# Patient Record
Sex: FEMALE | Race: WHITE | NOT HISPANIC OR LATINO | Employment: OTHER | URBAN - METROPOLITAN AREA
[De-identification: names, ages, dates, MRNs, and addresses within clinical notes are randomized per-mention and may not be internally consistent; named-entity substitution may affect disease eponyms.]

---

## 2017-01-01 ENCOUNTER — HOSPITAL ENCOUNTER (INPATIENT)
Facility: HOSPITAL | Age: 77
LOS: 3 days | Discharge: HOME | DRG: 064 | End: 2017-02-19
Attending: INTERNAL MEDICINE | Admitting: INTERNAL MEDICINE
Payer: MEDICARE

## 2017-01-01 ENCOUNTER — HOSPITAL ENCOUNTER (OUTPATIENT)
Dept: MRI IMAGING | Facility: HOSPITAL | Age: 77
Discharge: HOME | End: 2017-06-05
Payer: MEDICARE

## 2017-01-01 ENCOUNTER — HOSPITAL ENCOUNTER (OUTPATIENT)
Dept: INFUSION CENTER | Facility: HOSPITAL | Age: 77
Discharge: HOME | End: 2017-04-07
Payer: MEDICARE

## 2017-01-01 ENCOUNTER — TRANSCRIBE ORDERS (OUTPATIENT)
Dept: LAB | Facility: CLINIC | Age: 77
End: 2017-01-01

## 2017-01-01 ENCOUNTER — GENERIC CONVERSION - ENCOUNTER (OUTPATIENT)
Dept: OTHER | Facility: OTHER | Age: 77
End: 2017-01-01

## 2017-01-01 ENCOUNTER — HOSPITAL ENCOUNTER (INPATIENT)
Facility: HOSPITAL | Age: 77
LOS: 1 days | DRG: 271 | End: 2017-08-05
Attending: INTERNAL MEDICINE | Admitting: INTERNAL MEDICINE
Payer: MEDICARE

## 2017-01-01 ENCOUNTER — HOSPITAL ENCOUNTER (OUTPATIENT)
Facility: HOSPITAL | Age: 77
Setting detail: OUTPATIENT SURGERY
Discharge: HOME | End: 2017-03-31
Attending: SURGERY | Admitting: SURGERY
Payer: MEDICARE

## 2017-01-01 ENCOUNTER — APPOINTMENT (OUTPATIENT)
Dept: RADIATION ONCOLOGY | Facility: CLINIC | Age: 77
End: 2017-01-01
Attending: RADIOLOGY
Payer: MEDICARE

## 2017-01-01 ENCOUNTER — APPOINTMENT (INPATIENT)
Dept: RADIOLOGY | Facility: HOSPITAL | Age: 77
DRG: 271 | End: 2017-01-01
Payer: MEDICARE

## 2017-01-01 ENCOUNTER — HOSPITAL ENCOUNTER (OUTPATIENT)
Dept: NON INVASIVE DIAGNOSTICS | Facility: HOSPITAL | Age: 77
Discharge: HOME | End: 2017-01-06
Attending: INTERNAL MEDICINE
Payer: MEDICARE

## 2017-01-01 ENCOUNTER — ALLSCRIPTS OFFICE VISIT (OUTPATIENT)
Dept: OTHER | Facility: OTHER | Age: 77
End: 2017-01-01

## 2017-01-01 ENCOUNTER — APPOINTMENT (OUTPATIENT)
Dept: LAB | Facility: HOSPITAL | Age: 77
End: 2017-01-01
Attending: INTERNAL MEDICINE
Payer: MEDICARE

## 2017-01-01 ENCOUNTER — ANESTHESIA EVENT (INPATIENT)
Dept: PERIOP | Facility: HOSPITAL | Age: 77
DRG: 271 | End: 2017-01-01
Payer: MEDICARE

## 2017-01-01 ENCOUNTER — GENERIC CONVERSION - ENCOUNTER (OUTPATIENT)
Dept: INTERNAL MEDICINE CLINIC | Facility: CLINIC | Age: 77
End: 2017-01-01

## 2017-01-01 ENCOUNTER — HOSPITAL ENCOUNTER (INPATIENT)
Dept: RADIOLOGY | Facility: HOSPITAL | Age: 77
Discharge: HOME | DRG: 064 | End: 2017-02-17
Attending: INTERNAL MEDICINE
Payer: MEDICARE

## 2017-01-01 ENCOUNTER — HOSPITAL ENCOUNTER (EMERGENCY)
Facility: HOSPITAL | Age: 77
End: 2017-08-04
Attending: EMERGENCY MEDICINE | Admitting: EMERGENCY MEDICINE
Payer: MEDICARE

## 2017-01-01 ENCOUNTER — HOSPITAL ENCOUNTER (OUTPATIENT)
Dept: NON INVASIVE DIAGNOSTICS | Facility: HOSPITAL | Age: 77
Discharge: HOME | End: 2017-03-08
Attending: INTERNAL MEDICINE
Payer: MEDICARE

## 2017-01-01 ENCOUNTER — APPOINTMENT (INPATIENT)
Dept: RADIOLOGY | Facility: HOSPITAL | Age: 77
DRG: 064 | End: 2017-01-01
Payer: MEDICARE

## 2017-01-01 ENCOUNTER — HOSPITAL ENCOUNTER (OUTPATIENT)
Dept: NON INVASIVE DIAGNOSTICS | Facility: HOSPITAL | Age: 77
Discharge: HOME | End: 2017-02-08
Attending: INTERNAL MEDICINE
Payer: MEDICARE

## 2017-01-01 ENCOUNTER — ANESTHESIA (INPATIENT)
Dept: PERIOP | Facility: HOSPITAL | Age: 77
DRG: 271 | End: 2017-01-01
Payer: MEDICARE

## 2017-01-01 ENCOUNTER — ANESTHESIA EVENT (OUTPATIENT)
Dept: PERIOP | Facility: HOSPITAL | Age: 77
End: 2017-01-01
Payer: MEDICARE

## 2017-01-01 ENCOUNTER — HOSPITAL ENCOUNTER (EMERGENCY)
Facility: HOSPITAL | Age: 77
End: 2017-02-16
Attending: EMERGENCY MEDICINE | Admitting: EMERGENCY MEDICINE
Payer: MEDICARE

## 2017-01-01 ENCOUNTER — APPOINTMENT (EMERGENCY)
Dept: CT IMAGING | Facility: HOSPITAL | Age: 77
End: 2017-01-01
Payer: MEDICARE

## 2017-01-01 ENCOUNTER — HOSPITAL ENCOUNTER (OUTPATIENT)
Dept: CT IMAGING | Facility: HOSPITAL | Age: 77
Discharge: HOME | End: 2017-05-15
Attending: INTERNAL MEDICINE
Payer: MEDICARE

## 2017-01-01 ENCOUNTER — HOSPITAL ENCOUNTER (OUTPATIENT)
Dept: INFUSION CENTER | Facility: HOSPITAL | Age: 77
Discharge: HOME | End: 2017-02-01
Payer: MEDICARE

## 2017-01-01 ENCOUNTER — HOSPITAL ENCOUNTER (OUTPATIENT)
Dept: INFUSION CENTER | Facility: HOSPITAL | Age: 77
Discharge: HOME | End: 2017-01-12
Payer: MEDICARE

## 2017-01-01 ENCOUNTER — APPOINTMENT (EMERGENCY)
Dept: RADIOLOGY | Facility: HOSPITAL | Age: 77
End: 2017-01-01
Payer: MEDICARE

## 2017-01-01 ENCOUNTER — TRANSCRIBE ORDERS (OUTPATIENT)
Dept: ADMINISTRATIVE | Facility: HOSPITAL | Age: 77
End: 2017-01-01

## 2017-01-01 ENCOUNTER — HOSPITAL ENCOUNTER (OUTPATIENT)
Dept: INFUSION CENTER | Facility: HOSPITAL | Age: 77
Discharge: HOME | End: 2017-02-20
Payer: MEDICARE

## 2017-01-01 ENCOUNTER — HOSPITAL ENCOUNTER (OUTPATIENT)
Dept: NON INVASIVE DIAGNOSTICS | Facility: HOSPITAL | Age: 77
Discharge: HOME | End: 2017-08-05
Attending: ANESTHESIOLOGY
Payer: MEDICARE

## 2017-01-01 ENCOUNTER — APPOINTMENT (OUTPATIENT)
Dept: LAB | Facility: CLINIC | Age: 77
End: 2017-01-01
Attending: RADIOLOGY
Payer: MEDICARE

## 2017-01-01 ENCOUNTER — APPOINTMENT (INPATIENT)
Dept: NON INVASIVE DIAGNOSTICS | Facility: HOSPITAL | Age: 77
DRG: 271 | End: 2017-01-01
Payer: MEDICARE

## 2017-01-01 ENCOUNTER — SOCIAL WORK (OUTPATIENT)
Dept: SOCIAL WORK | Facility: HOSPITAL | Age: 77
End: 2017-01-01

## 2017-01-01 ENCOUNTER — ANESTHESIA (OUTPATIENT)
Dept: PERIOP | Facility: HOSPITAL | Age: 77
End: 2017-01-01
Payer: MEDICARE

## 2017-01-01 ENCOUNTER — HOSPITAL ENCOUNTER (OUTPATIENT)
Dept: MRI IMAGING | Facility: HOSPITAL | Age: 77
Discharge: HOME | End: 2017-04-03
Payer: MEDICARE

## 2017-01-01 ENCOUNTER — APPOINTMENT (OUTPATIENT)
Dept: LAB | Facility: HOSPITAL | Age: 77
End: 2017-01-01
Payer: MEDICARE

## 2017-01-01 VITALS
WEIGHT: 105.38 LBS | HEART RATE: 80 BPM | RESPIRATION RATE: 18 BRPM | SYSTOLIC BLOOD PRESSURE: 148 MMHG | BODY MASS INDEX: 19.39 KG/M2 | OXYGEN SATURATION: 97 % | TEMPERATURE: 97.8 F | HEIGHT: 62 IN | DIASTOLIC BLOOD PRESSURE: 72 MMHG

## 2017-01-01 VITALS
SYSTOLIC BLOOD PRESSURE: 159 MMHG | TEMPERATURE: 98.7 F | HEART RATE: 85 BPM | DIASTOLIC BLOOD PRESSURE: 68 MMHG | HEIGHT: 62 IN | WEIGHT: 106.48 LBS | OXYGEN SATURATION: 97 % | BODY MASS INDEX: 19.6 KG/M2 | RESPIRATION RATE: 20 BRPM

## 2017-01-01 VITALS
HEART RATE: 89 BPM | WEIGHT: 106.6 LBS | TEMPERATURE: 98.9 F | DIASTOLIC BLOOD PRESSURE: 82 MMHG | BODY MASS INDEX: 19.52 KG/M2 | OXYGEN SATURATION: 98 % | RESPIRATION RATE: 18 BRPM | SYSTOLIC BLOOD PRESSURE: 193 MMHG

## 2017-01-01 VITALS
BODY MASS INDEX: 19.51 KG/M2 | WEIGHT: 106.04 LBS | OXYGEN SATURATION: 96 % | RESPIRATION RATE: 18 BRPM | TEMPERATURE: 98.5 F | HEIGHT: 62 IN | DIASTOLIC BLOOD PRESSURE: 72 MMHG | SYSTOLIC BLOOD PRESSURE: 140 MMHG | HEART RATE: 88 BPM

## 2017-01-01 VITALS
SYSTOLIC BLOOD PRESSURE: 102 MMHG | TEMPERATURE: 98 F | WEIGHT: 105.82 LBS | RESPIRATION RATE: 18 BRPM | DIASTOLIC BLOOD PRESSURE: 50 MMHG | HEART RATE: 85 BPM | BODY MASS INDEX: 19.35 KG/M2 | OXYGEN SATURATION: 97 %

## 2017-01-01 VITALS
SYSTOLIC BLOOD PRESSURE: 102 MMHG | TEMPERATURE: 93.8 F | BODY MASS INDEX: 18.48 KG/M2 | HEIGHT: 63 IN | RESPIRATION RATE: 24 BRPM | OXYGEN SATURATION: 97 % | WEIGHT: 104.28 LBS | DIASTOLIC BLOOD PRESSURE: 62 MMHG | HEART RATE: 136 BPM

## 2017-01-01 VITALS
HEART RATE: 93 BPM | SYSTOLIC BLOOD PRESSURE: 142 MMHG | HEIGHT: 62 IN | OXYGEN SATURATION: 96 % | DIASTOLIC BLOOD PRESSURE: 80 MMHG | WEIGHT: 106 LBS | TEMPERATURE: 98.7 F | BODY MASS INDEX: 19.51 KG/M2 | RESPIRATION RATE: 20 BRPM

## 2017-01-01 DIAGNOSIS — I62.00 SUBDURAL HEMORRHAGE (HCC): Primary | ICD-10-CM

## 2017-01-01 DIAGNOSIS — C71.9 MALIGNANT NEOPLASM OF BRAIN, UNSPECIFIED LOCATION (HCC): Primary | ICD-10-CM

## 2017-01-01 DIAGNOSIS — N17.9 AKI (ACUTE KIDNEY INJURY) (HCC): ICD-10-CM

## 2017-01-01 DIAGNOSIS — C79.31 BRAIN METASTASIS (HCC): ICD-10-CM

## 2017-01-01 DIAGNOSIS — E78.5 HYPERLIPIDEMIA: ICD-10-CM

## 2017-01-01 DIAGNOSIS — C34.91 PRIMARY LUNG CANCER WITH METASTASIS FROM LUNG TO OTHER SITE, RIGHT (HCC): Primary | ICD-10-CM

## 2017-01-01 DIAGNOSIS — J90 PLEURAL EFFUSION, NOT ELSEWHERE CLASSIFIED: ICD-10-CM

## 2017-01-01 DIAGNOSIS — C79.49 SECONDARY MALIGNANT NEOPLASM OF BRAIN AND SPINAL CORD (HCC): Primary | ICD-10-CM

## 2017-01-01 DIAGNOSIS — I31.3 PERICARDIAL EFFUSION: ICD-10-CM

## 2017-01-01 DIAGNOSIS — I10 ESSENTIAL (PRIMARY) HYPERTENSION: ICD-10-CM

## 2017-01-01 DIAGNOSIS — R19.7 DIARRHEA: Primary | ICD-10-CM

## 2017-01-01 DIAGNOSIS — C34.91 MALIGNANT NEOPLASM OF UNSPECIFIED PART OF RIGHT BRONCHUS OR LUNG (HCC): ICD-10-CM

## 2017-01-01 DIAGNOSIS — I31.3 MALIGNANT PERICARDIAL EFFUSION (HCC): ICD-10-CM

## 2017-01-01 DIAGNOSIS — C71.9 MALIGNANT NEOPLASM OF BRAIN, UNSPECIFIED LOCATION (HCC): ICD-10-CM

## 2017-01-01 DIAGNOSIS — I31.9 PERICARDIAL DISEASE: ICD-10-CM

## 2017-01-01 DIAGNOSIS — R79.89 ELEVATED LACTIC ACID LEVEL: ICD-10-CM

## 2017-01-01 DIAGNOSIS — I62.00 SUBDURAL BLEEDING (HCC): Primary | ICD-10-CM

## 2017-01-01 DIAGNOSIS — C79.31 SECONDARY MALIGNANT NEOPLASM OF BRAIN (HCC): ICD-10-CM

## 2017-01-01 DIAGNOSIS — I31.3 NONINFLAMMATORY PERICARDIAL EFFUSION: ICD-10-CM

## 2017-01-01 DIAGNOSIS — C80.1 MALIGNANT PERICARDIAL EFFUSION (HCC): ICD-10-CM

## 2017-01-01 DIAGNOSIS — R90.0 INTRACRANIAL SPACE-OCCUPYING LESION FOUND ON DIAGNOSTIC IMAGING OF CENTRAL NERVOUS SYSTEM: ICD-10-CM

## 2017-01-01 DIAGNOSIS — Z95.828 BLOOD VESSEL REPLACED BY OTHER MEANS: ICD-10-CM

## 2017-01-01 DIAGNOSIS — C78.7 SECONDARY MALIGNANT NEOPLASM OF LIVER AND INTRAHEPATIC BILE DUCT (HCC): ICD-10-CM

## 2017-01-01 DIAGNOSIS — C79.31 SECONDARY MALIGNANT NEOPLASM OF BRAIN AND SPINAL CORD (HCC): Primary | ICD-10-CM

## 2017-01-01 DIAGNOSIS — C34.91 PRIMARY LUNG CANCER WITH METASTASIS FROM LUNG TO OTHER SITE, RIGHT (HCC): ICD-10-CM

## 2017-01-01 DIAGNOSIS — R26.89 IMBALANCE: ICD-10-CM

## 2017-01-01 DIAGNOSIS — C80.1 ADENOCARCINOMA (HCC): ICD-10-CM

## 2017-01-01 DIAGNOSIS — C79.49 SECONDARY MALIGNANT NEOPLASM OF BRAIN AND SPINAL CORD (HCC): ICD-10-CM

## 2017-01-01 DIAGNOSIS — C79.31 SECONDARY MALIGNANT NEOPLASM OF BRAIN AND SPINAL CORD (HCC): ICD-10-CM

## 2017-01-01 DIAGNOSIS — J91.0 MALIGNANT PLEURAL EFFUSION: Primary | ICD-10-CM

## 2017-01-01 DIAGNOSIS — R34 OLIGURIA: ICD-10-CM

## 2017-01-01 DIAGNOSIS — R57.9 SHOCK CIRCULATORY (HCC): ICD-10-CM

## 2017-01-01 LAB
ABO GROUP BLD: NORMAL
ABO GROUP BLD: NORMAL
ALBUMIN SERPL BCP-MCNC: 2 G/DL (ref 3.5–5)
ALBUMIN SERPL BCP-MCNC: 2.6 G/DL (ref 3.5–5)
ALBUMIN SERPL BCP-MCNC: 2.7 G/DL (ref 3.5–5)
ALBUMIN SERPL BCP-MCNC: 2.9 G/DL (ref 3.5–5)
ALBUMIN SERPL BCP-MCNC: 2.9 G/DL (ref 3.5–5)
ALBUMIN SERPL BCP-MCNC: 3 G/DL (ref 3.5–5)
ALBUMIN SERPL BCP-MCNC: 3 G/DL (ref 3.5–5)
ALBUMIN SERPL BCP-MCNC: 3.1 G/DL (ref 3.5–5)
ALBUMIN SERPL BCP-MCNC: 3.2 G/DL (ref 3.5–5)
ALBUMIN SERPL BCP-MCNC: 3.3 G/DL (ref 3.5–5)
ALP SERPL-CCNC: 109 U/L (ref 46–116)
ALP SERPL-CCNC: 65 U/L (ref 46–116)
ALP SERPL-CCNC: 66 U/L (ref 46–116)
ALP SERPL-CCNC: 73 U/L (ref 46–116)
ALP SERPL-CCNC: 74 U/L (ref 46–116)
ALP SERPL-CCNC: 77 U/L (ref 46–116)
ALP SERPL-CCNC: 78 U/L (ref 46–116)
ALP SERPL-CCNC: 78 U/L (ref 46–116)
ALP SERPL-CCNC: 85 U/L (ref 46–116)
ALP SERPL-CCNC: 95 U/L (ref 46–116)
ALT SERPL W P-5'-P-CCNC: 19 U/L (ref 12–78)
ALT SERPL W P-5'-P-CCNC: 2032 U/L (ref 12–78)
ALT SERPL W P-5'-P-CCNC: 22 U/L (ref 12–78)
ALT SERPL W P-5'-P-CCNC: 23 U/L (ref 12–78)
ALT SERPL W P-5'-P-CCNC: 23 U/L (ref 12–78)
ALT SERPL W P-5'-P-CCNC: 28 U/L (ref 12–78)
ALT SERPL W P-5'-P-CCNC: 30 U/L (ref 12–78)
ALT SERPL W P-5'-P-CCNC: 36 U/L (ref 12–78)
ALT SERPL W P-5'-P-CCNC: 37 U/L (ref 12–78)
ALT SERPL W P-5'-P-CCNC: 96 U/L (ref 12–78)
ANION GAP SERPL CALCULATED.3IONS-SCNC: 10 MMOL/L (ref 4–13)
ANION GAP SERPL CALCULATED.3IONS-SCNC: 10 MMOL/L (ref 4–13)
ANION GAP SERPL CALCULATED.3IONS-SCNC: 11 MMOL/L (ref 4–13)
ANION GAP SERPL CALCULATED.3IONS-SCNC: 15 MMOL/L (ref 4–13)
ANION GAP SERPL CALCULATED.3IONS-SCNC: 20 MMOL/L (ref 4–13)
ANION GAP SERPL CALCULATED.3IONS-SCNC: 25 MMOL/L (ref 4–13)
ANION GAP SERPL CALCULATED.3IONS-SCNC: 5 MMOL/L (ref 4–13)
ANION GAP SERPL CALCULATED.3IONS-SCNC: 6 MMOL/L (ref 4–13)
ANION GAP SERPL CALCULATED.3IONS-SCNC: 6 MMOL/L (ref 4–13)
ANION GAP SERPL CALCULATED.3IONS-SCNC: 7 MMOL/L (ref 4–13)
ANION GAP SERPL CALCULATED.3IONS-SCNC: 8 MMOL/L (ref 4–13)
ANION GAP SERPL CALCULATED.3IONS-SCNC: 9 MMOL/L (ref 4–13)
ANISOCYTOSIS BLD QL SMEAR: PRESENT
APTT PPP: 26 SECONDS (ref 24–36)
APTT PPP: 29 SECONDS (ref 23–35)
APTT PPP: 47 SECONDS (ref 23–35)
AST SERPL W P-5'-P-CCNC: 22 U/L (ref 5–45)
AST SERPL W P-5'-P-CCNC: 24 U/L (ref 5–45)
AST SERPL W P-5'-P-CCNC: 2731 U/L (ref 5–45)
AST SERPL W P-5'-P-CCNC: 29 U/L (ref 5–45)
AST SERPL W P-5'-P-CCNC: 31 U/L (ref 5–45)
AST SERPL W P-5'-P-CCNC: 31 U/L (ref 5–45)
AST SERPL W P-5'-P-CCNC: 36 U/L (ref 5–45)
AST SERPL W P-5'-P-CCNC: 37 U/L (ref 5–45)
AST SERPL W P-5'-P-CCNC: 51 U/L (ref 5–45)
AST SERPL W P-5'-P-CCNC: 95 U/L (ref 5–45)
ATRIAL RATE: 103 BPM
BACTERIA CATH TIP CULT: NO GROWTH
BACTERIA CATH TIP CULT: NORMAL
BACTERIA UR QL AUTO: ABNORMAL /HPF
BASE EXCESS BLDA CALC-SCNC: -21 MMOL/L (ref -2–3)
BASE EXCESS BLDA CALC-SCNC: -23 MMOL/L (ref -2–3)
BASE EXCESS BLDA CALC-SCNC: -7 MMOL/L (ref -2–3)
BASOPHILS # BLD AUTO: 0 THOUSAND/UL (ref 0–0.1)
BASOPHILS # BLD AUTO: 0.01 THOUSANDS/ΜL (ref 0–0.1)
BASOPHILS # BLD AUTO: 0.02 THOUSANDS/ΜL (ref 0–0.1)
BASOPHILS # BLD AUTO: 0.03 THOUSANDS/ΜL (ref 0–0.1)
BASOPHILS # BLD AUTO: 0.04 THOUSANDS/ΜL (ref 0–0.1)
BASOPHILS # BLD AUTO: 0.12 THOUSAND/UL (ref 0–0.1)
BASOPHILS # BLD MANUAL: 0 THOUSAND/UL (ref 0–0.1)
BASOPHILS # BLD MANUAL: 0 THOUSAND/UL (ref 0–0.1)
BASOPHILS NFR BLD AUTO: 0 % (ref 0–1)
BASOPHILS NFR BLD AUTO: 1 % (ref 0–1)
BASOPHILS NFR MAR MANUAL: 0 % (ref 0–1)
BASOPHILS NFR MAR MANUAL: 1 % (ref 0–1)
BILIRUB SERPL-MCNC: 0.31 MG/DL (ref 0.2–1)
BILIRUB SERPL-MCNC: 0.4 MG/DL (ref 0.2–1)
BILIRUB SERPL-MCNC: 0.46 MG/DL (ref 0.2–1)
BILIRUB SERPL-MCNC: 0.5 MG/DL (ref 0.2–1)
BILIRUB SERPL-MCNC: 0.8 MG/DL (ref 0.2–1)
BILIRUB SERPL-MCNC: 1.31 MG/DL (ref 0.2–1)
BILIRUB UR QL STRIP: ABNORMAL
BLD GP AB SCN SERPL QL: NEGATIVE
BLD GP AB SCN SERPL QL: NEGATIVE
BUN SERPL-MCNC: 11 MG/DL (ref 5–25)
BUN SERPL-MCNC: 11 MG/DL (ref 5–25)
BUN SERPL-MCNC: 12 MG/DL (ref 5–25)
BUN SERPL-MCNC: 13 MG/DL (ref 5–25)
BUN SERPL-MCNC: 14 MG/DL (ref 5–25)
BUN SERPL-MCNC: 14 MG/DL (ref 5–25)
BUN SERPL-MCNC: 18 MG/DL (ref 5–25)
BUN SERPL-MCNC: 18 MG/DL (ref 5–25)
BUN SERPL-MCNC: 21 MG/DL (ref 5–25)
BUN SERPL-MCNC: 24 MG/DL (ref 5–25)
BUN SERPL-MCNC: 29 MG/DL (ref 5–25)
BUN SERPL-MCNC: 30 MG/DL (ref 5–25)
BUN SERPL-MCNC: 31 MG/DL (ref 5–25)
BURR CELLS BLD QL SMEAR: PRESENT
BURR CELLS BLD QL SMEAR: PRESENT
CA-I BLD-SCNC: 0.84 MMOL/L (ref 1.12–1.32)
CA-I BLD-SCNC: 0.95 MMOL/L (ref 1.12–1.32)
CA-I BLD-SCNC: 1.07 MMOL/L (ref 1.12–1.32)
CA-I BLD-SCNC: 1.15 MMOL/L (ref 1.12–1.32)
CALCIUM SERPL-MCNC: 7.2 MG/DL (ref 8.3–10.1)
CALCIUM SERPL-MCNC: 8.4 MG/DL (ref 8.3–10.1)
CALCIUM SERPL-MCNC: 8.5 MG/DL (ref 8.3–10.1)
CALCIUM SERPL-MCNC: 8.5 MG/DL (ref 8.3–10.1)
CALCIUM SERPL-MCNC: 8.6 MG/DL (ref 8.3–10.1)
CALCIUM SERPL-MCNC: 8.8 MG/DL (ref 8.3–10.1)
CALCIUM SERPL-MCNC: 8.9 MG/DL (ref 8.3–10.1)
CALCIUM SERPL-MCNC: 9 MG/DL (ref 8.3–10.1)
CALCIUM SERPL-MCNC: 9.1 MG/DL (ref 8.3–10.1)
CALCIUM SERPL-MCNC: 9.1 MG/DL (ref 8.3–10.1)
CHLORIDE SERPL-SCNC: 100 MMOL/L (ref 100–108)
CHLORIDE SERPL-SCNC: 101 MMOL/L (ref 100–108)
CHLORIDE SERPL-SCNC: 102 MMOL/L (ref 100–108)
CHLORIDE SERPL-SCNC: 103 MMOL/L (ref 100–108)
CHLORIDE SERPL-SCNC: 103 MMOL/L (ref 100–108)
CHLORIDE SERPL-SCNC: 104 MMOL/L (ref 100–108)
CHLORIDE SERPL-SCNC: 106 MMOL/L (ref 100–108)
CHLORIDE SERPL-SCNC: 110 MMOL/L (ref 100–108)
CHLORIDE SERPL-SCNC: 114 MMOL/L (ref 100–108)
CHLORIDE SERPL-SCNC: 114 MMOL/L (ref 100–108)
CHLORIDE UR-SCNC: 14 MMOL/L (ref 10–330)
CLARITY UR: ABNORMAL
CO2 SERPL-SCNC: 15 MMOL/L (ref 21–32)
CO2 SERPL-SCNC: 25 MMOL/L (ref 21–32)
CO2 SERPL-SCNC: 28 MMOL/L (ref 21–32)
CO2 SERPL-SCNC: 29 MMOL/L (ref 21–32)
CO2 SERPL-SCNC: 30 MMOL/L (ref 21–32)
CO2 SERPL-SCNC: 31 MMOL/L (ref 21–32)
CO2 SERPL-SCNC: 32 MMOL/L (ref 21–32)
CO2 SERPL-SCNC: 34 MMOL/L (ref 21–32)
CO2 SERPL-SCNC: 9 MMOL/L (ref 21–32)
CO2 SERPL-SCNC: 9 MMOL/L (ref 21–32)
COLOR UR: ABNORMAL
CREAT SERPL-MCNC: 0.5 MG/DL (ref 0.6–1.3)
CREAT SERPL-MCNC: 0.59 MG/DL (ref 0.6–1.3)
CREAT SERPL-MCNC: 0.62 MG/DL (ref 0.6–1.3)
CREAT SERPL-MCNC: 0.63 MG/DL (ref 0.6–1.3)
CREAT SERPL-MCNC: 0.64 MG/DL (ref 0.6–1.3)
CREAT SERPL-MCNC: 0.68 MG/DL (ref 0.6–1.3)
CREAT SERPL-MCNC: 0.7 MG/DL (ref 0.6–1.3)
CREAT SERPL-MCNC: 0.7 MG/DL (ref 0.6–1.3)
CREAT SERPL-MCNC: 0.73 MG/DL (ref 0.6–1.3)
CREAT SERPL-MCNC: 1.25 MG/DL (ref 0.6–1.3)
CREAT SERPL-MCNC: 1.46 MG/DL (ref 0.6–1.3)
CREAT SERPL-MCNC: 1.55 MG/DL (ref 0.6–1.3)
CREAT SERPL-MCNC: 1.62 MG/DL (ref 0.6–1.3)
CREAT UR-MCNC: 126 MG/DL
EOSINOPHIL # BLD AUTO: 0 THOUSAND/UL (ref 0–0.61)
EOSINOPHIL # BLD AUTO: 0 THOUSAND/UL (ref 0–0.61)
EOSINOPHIL # BLD AUTO: 0 THOUSAND/ΜL (ref 0–0.61)
EOSINOPHIL # BLD AUTO: 0.01 THOUSAND/ΜL (ref 0–0.61)
EOSINOPHIL # BLD AUTO: 0.06 THOUSAND/ΜL (ref 0–0.61)
EOSINOPHIL # BLD AUTO: 0.06 THOUSAND/ΜL (ref 0–0.61)
EOSINOPHIL # BLD AUTO: 0.07 THOUSAND/ΜL (ref 0–0.61)
EOSINOPHIL # BLD AUTO: 0.09 THOUSAND/ΜL (ref 0–0.61)
EOSINOPHIL # BLD AUTO: 0.13 THOUSAND/ΜL (ref 0–0.61)
EOSINOPHIL # BLD AUTO: 0.19 THOUSAND/ΜL (ref 0–0.61)
EOSINOPHIL # BLD MANUAL: 0 THOUSAND/UL (ref 0–0.4)
EOSINOPHIL # BLD MANUAL: 0 THOUSAND/UL (ref 0–0.4)
EOSINOPHIL NFR BLD AUTO: 0 % (ref 0–6)
EOSINOPHIL NFR BLD AUTO: 0 % (ref 0–6)
EOSINOPHIL NFR BLD AUTO: 1 % (ref 0–6)
EOSINOPHIL NFR BLD AUTO: 2 % (ref 0–6)
EOSINOPHIL NFR BLD AUTO: 3 % (ref 0–6)
EOSINOPHIL NFR BLD AUTO: 4 % (ref 0–6)
EOSINOPHIL NFR BLD MANUAL: 0 % (ref 0–6)
ERYTHROCYTE [DISTWIDTH] IN BLOOD BY AUTOMATED COUNT: 12.7 % (ref 11.6–15.1)
ERYTHROCYTE [DISTWIDTH] IN BLOOD BY AUTOMATED COUNT: 12.8 % (ref 11.6–15.1)
ERYTHROCYTE [DISTWIDTH] IN BLOOD BY AUTOMATED COUNT: 12.8 % (ref 11.6–15.1)
ERYTHROCYTE [DISTWIDTH] IN BLOOD BY AUTOMATED COUNT: 13.1 % (ref 11.6–15.1)
ERYTHROCYTE [DISTWIDTH] IN BLOOD BY AUTOMATED COUNT: 13.2 % (ref 11.6–15.1)
ERYTHROCYTE [DISTWIDTH] IN BLOOD BY AUTOMATED COUNT: 13.3 % (ref 11.6–15.1)
ERYTHROCYTE [DISTWIDTH] IN BLOOD BY AUTOMATED COUNT: 13.3 % (ref 11.6–15.1)
ERYTHROCYTE [DISTWIDTH] IN BLOOD BY AUTOMATED COUNT: 13.4 % (ref 11.6–15.1)
ERYTHROCYTE [DISTWIDTH] IN BLOOD BY AUTOMATED COUNT: 13.8 % (ref 11.6–15.1)
ERYTHROCYTE [DISTWIDTH] IN BLOOD BY AUTOMATED COUNT: 13.8 % (ref 11.6–15.1)
ERYTHROCYTE [DISTWIDTH] IN BLOOD BY AUTOMATED COUNT: 13.9 % (ref 11.6–15.1)
ERYTHROCYTE [DISTWIDTH] IN BLOOD BY AUTOMATED COUNT: 14.1 % (ref 11.6–15.1)
ERYTHROCYTE [DISTWIDTH] IN BLOOD BY AUTOMATED COUNT: 15.1 % (ref 11.6–15.1)
ERYTHROCYTE [DISTWIDTH] IN BLOOD BY AUTOMATED COUNT: 16.1 % (ref 11.6–15.1)
FINE GRAN CASTS URNS QL MICRO: ABNORMAL /LPF
GFR SERPL CREATININE-BSD FRML MDRD: 31 ML/MIN/1.73SQ M
GFR SERPL CREATININE-BSD FRML MDRD: 33 ML/MIN/1.73SQ M
GFR SERPL CREATININE-BSD FRML MDRD: 35 ML/MIN/1.73SQ M
GFR SERPL CREATININE-BSD FRML MDRD: 42 ML/MIN/1.73SQ M
GFR SERPL CREATININE-BSD FRML MDRD: >60 ML/MIN/1.73SQ M
GLUCOSE SERPL-MCNC: 103 MG/DL (ref 65–140)
GLUCOSE SERPL-MCNC: 105 MG/DL (ref 65–140)
GLUCOSE SERPL-MCNC: 112 MG/DL (ref 65–140)
GLUCOSE SERPL-MCNC: 116 MG/DL (ref 65–140)
GLUCOSE SERPL-MCNC: 116 MG/DL (ref 65–140)
GLUCOSE SERPL-MCNC: 126 MG/DL (ref 65–140)
GLUCOSE SERPL-MCNC: 155 MG/DL (ref 65–140)
GLUCOSE SERPL-MCNC: 174 MG/DL (ref 65–140)
GLUCOSE SERPL-MCNC: 181 MG/DL (ref 65–140)
GLUCOSE SERPL-MCNC: 22 MG/DL (ref 65–140)
GLUCOSE SERPL-MCNC: 25 MG/DL (ref 65–140)
GLUCOSE SERPL-MCNC: 86 MG/DL (ref 65–140)
GLUCOSE SERPL-MCNC: 91 MG/DL (ref 65–140)
GLUCOSE SERPL-MCNC: 91 MG/DL (ref 65–140)
GLUCOSE SERPL-MCNC: 96 MG/DL (ref 65–140)
GLUCOSE SERPL-MCNC: 97 MG/DL (ref 65–140)
GLUCOSE UR STRIP-MCNC: NEGATIVE MG/DL
HCO3 BLDA-SCNC: 18.9 MMOL/L (ref 22–28)
HCO3 BLDA-SCNC: 5.9 MMOL/L (ref 22–28)
HCO3 BLDA-SCNC: 7.2 MMOL/L (ref 22–28)
HCT VFR BLD AUTO: 32 % (ref 34.8–46.1)
HCT VFR BLD AUTO: 36 % (ref 34.8–46.1)
HCT VFR BLD AUTO: 36.9 % (ref 34.8–46.1)
HCT VFR BLD AUTO: 39.1 % (ref 34.8–46.1)
HCT VFR BLD AUTO: 39.9 % (ref 34.8–46.1)
HCT VFR BLD AUTO: 40.4 % (ref 34.8–46.1)
HCT VFR BLD AUTO: 40.8 % (ref 34.8–46.1)
HCT VFR BLD AUTO: 41.1 % (ref 34.8–46.1)
HCT VFR BLD AUTO: 42.6 % (ref 34.8–46.1)
HCT VFR BLD AUTO: 42.7 % (ref 34.8–46.1)
HCT VFR BLD AUTO: 44.2 % (ref 34.8–46.1)
HCT VFR BLD AUTO: 44.3 % (ref 34.8–46.1)
HCT VFR BLD AUTO: 45.4 % (ref 34.8–46.1)
HCT VFR BLD AUTO: 46.3 % (ref 34.8–46.1)
HCT VFR BLD CALC: 15 % (ref 34.8–46.1)
HCT VFR BLD CALC: 24 % (ref 34.8–46.1)
HCT VFR BLD CALC: 30 % (ref 34.8–46.1)
HGB BLD-MCNC: 10.2 G/DL (ref 11.5–15.4)
HGB BLD-MCNC: 11.7 G/DL (ref 11.5–15.4)
HGB BLD-MCNC: 11.9 G/DL (ref 11.5–15.4)
HGB BLD-MCNC: 12.7 G/DL (ref 11.5–15.4)
HGB BLD-MCNC: 13 G/DL (ref 11.5–15.4)
HGB BLD-MCNC: 13.1 G/DL (ref 11.5–15.4)
HGB BLD-MCNC: 13.2 G/DL (ref 11.5–15.4)
HGB BLD-MCNC: 13.6 G/DL (ref 11.5–15.4)
HGB BLD-MCNC: 13.6 G/DL (ref 11.5–15.4)
HGB BLD-MCNC: 13.8 G/DL (ref 11.5–15.4)
HGB BLD-MCNC: 14.2 G/DL (ref 11.5–15.4)
HGB BLD-MCNC: 14.5 G/DL (ref 11.5–15.4)
HGB BLD-MCNC: 14.6 G/DL (ref 11.5–15.4)
HGB BLD-MCNC: 14.7 G/DL (ref 11.5–15.4)
HGB BLDA-MCNC: 10.2 G/DL (ref 11.5–15.4)
HGB BLDA-MCNC: 5.1 G/DL (ref 11.5–15.4)
HGB BLDA-MCNC: 8.2 G/DL (ref 11.5–15.4)
HGB UR QL STRIP.AUTO: NEGATIVE
HOLD SPECIMEN: NORMAL
INR PPP: 1.1 (ref 0.86–1.16)
INR PPP: 1.23 (ref 0.86–1.16)
INR PPP: 1.66 (ref 0.86–1.16)
INR PPP: 4.34 (ref 0.86–1.16)
INR PPP: 6.55 (ref 0.86–1.16)
KETONES UR STRIP-MCNC: NEGATIVE MG/DL
LACTATE SERPL-SCNC: 11.1 MMOL/L (ref 0.5–2)
LACTATE SERPL-SCNC: 14.8 MMOL/L (ref 0.5–2)
LACTATE SERPL-SCNC: 2.9 MMOL/L (ref 0.5–2)
LACTATE SERPL-SCNC: 3.6 MMOL/L (ref 0.5–2)
LACTATE SERPL-SCNC: 3.9 MMOL/L (ref 0.5–2)
LACTATE SERPL-SCNC: 4 MMOL/L (ref 0.5–2)
LACTATE SERPL-SCNC: 6.5 MMOL/L (ref 0.5–2)
LEUKOCYTE ESTERASE UR QL STRIP: NEGATIVE
LIPASE SERPL-CCNC: 113 U/L (ref 73–393)
LYMPHOCYTES # BLD AUTO: 0.81 THOUSAND/UL (ref 0.6–4.47)
LYMPHOCYTES # BLD AUTO: 0.95 THOUSAND/UL (ref 0.6–4.47)
LYMPHOCYTES # BLD AUTO: 1.06 THOUSANDS/ΜL (ref 0.6–4.47)
LYMPHOCYTES # BLD AUTO: 1.14 THOUSANDS/ΜL (ref 0.6–4.47)
LYMPHOCYTES # BLD AUTO: 1.16 THOUSANDS/ΜL (ref 0.6–4.47)
LYMPHOCYTES # BLD AUTO: 1.21 THOUSANDS/ΜL (ref 0.6–4.47)
LYMPHOCYTES # BLD AUTO: 1.24 THOUSANDS/ΜL (ref 0.6–4.47)
LYMPHOCYTES # BLD AUTO: 1.25 THOUSANDS/ΜL (ref 0.6–4.47)
LYMPHOCYTES # BLD AUTO: 1.28 THOUSAND/UL (ref 0.6–4.47)
LYMPHOCYTES # BLD AUTO: 1.38 THOUSAND/UL (ref 0.6–4.47)
LYMPHOCYTES # BLD AUTO: 1.43 THOUSANDS/ΜL (ref 0.6–4.47)
LYMPHOCYTES # BLD AUTO: 1.58 THOUSANDS/ΜL (ref 0.6–4.47)
LYMPHOCYTES # BLD AUTO: 13 % (ref 14–44)
LYMPHOCYTES # BLD AUTO: 4 % (ref 14–44)
LYMPHOCYTES # BLD AUTO: 8 % (ref 14–44)
LYMPHOCYTES # BLD AUTO: 9 % (ref 14–44)
LYMPHOCYTES NFR BLD AUTO: 13 % (ref 14–44)
LYMPHOCYTES NFR BLD AUTO: 16 % (ref 14–44)
LYMPHOCYTES NFR BLD AUTO: 20 % (ref 14–44)
LYMPHOCYTES NFR BLD AUTO: 20 % (ref 14–44)
LYMPHOCYTES NFR BLD AUTO: 22 % (ref 14–44)
LYMPHOCYTES NFR BLD AUTO: 23 % (ref 14–44)
LYMPHOCYTES NFR BLD AUTO: 24 % (ref 14–44)
LYMPHOCYTES NFR BLD AUTO: 24 % (ref 14–44)
MACROCYTES BLD QL AUTO: PRESENT
MAGNESIUM SERPL-MCNC: 2 MG/DL (ref 1.6–2.6)
MAGNESIUM SERPL-MCNC: 2 MG/DL (ref 1.6–2.6)
MAGNESIUM SERPL-MCNC: 2.1 MG/DL (ref 1.6–2.6)
MAGNESIUM SERPL-MCNC: 2.2 MG/DL (ref 1.6–2.6)
MCH RBC QN AUTO: 29.3 PG (ref 26.8–34.3)
MCH RBC QN AUTO: 30.9 PG (ref 26.8–34.3)
MCH RBC QN AUTO: 31.2 PG (ref 26.8–34.3)
MCH RBC QN AUTO: 31.2 PG (ref 26.8–34.3)
MCH RBC QN AUTO: 31.3 PG (ref 26.8–34.3)
MCH RBC QN AUTO: 31.4 PG (ref 26.8–34.3)
MCH RBC QN AUTO: 31.5 PG (ref 26.8–34.3)
MCH RBC QN AUTO: 31.6 PG (ref 26.8–34.3)
MCH RBC QN AUTO: 31.6 PG (ref 26.8–34.3)
MCH RBC QN AUTO: 31.8 PG (ref 26.8–34.3)
MCH RBC QN AUTO: 31.8 PG (ref 26.8–34.3)
MCH RBC QN AUTO: 31.9 PG (ref 26.8–34.3)
MCH RBC QN AUTO: 31.9 PG (ref 26.8–34.3)
MCH RBC QN AUTO: 32 PG (ref 26.8–34.3)
MCHC RBC AUTO-ENTMCNC: 30.8 G/DL (ref 31.4–37.4)
MCHC RBC AUTO-ENTMCNC: 31.7 G/DL (ref 31.4–37.4)
MCHC RBC AUTO-ENTMCNC: 31.7 G/DL (ref 31.4–37.4)
MCHC RBC AUTO-ENTMCNC: 31.9 G/DL (ref 31.4–37.4)
MCHC RBC AUTO-ENTMCNC: 32.1 G/DL (ref 31.4–37.4)
MCHC RBC AUTO-ENTMCNC: 32.5 G/DL (ref 31.4–37.4)
MCHC RBC AUTO-ENTMCNC: 32.6 G/DL (ref 31.4–37.4)
MCHC RBC AUTO-ENTMCNC: 32.7 G/DL (ref 31.4–37.4)
MCHC RBC AUTO-ENTMCNC: 32.9 G/DL (ref 31.4–37.4)
MCHC RBC AUTO-ENTMCNC: 33.1 G/DL (ref 31.4–37.4)
MCHC RBC AUTO-ENTMCNC: 33.1 G/DL (ref 31.4–37.4)
MCHC RBC AUTO-ENTMCNC: 33.9 G/DL (ref 31.4–37.4)
MCV RBC AUTO: 100 FL (ref 82–98)
MCV RBC AUTO: 100 FL (ref 82–98)
MCV RBC AUTO: 101 FL (ref 82–98)
MCV RBC AUTO: 92 FL (ref 82–98)
MCV RBC AUTO: 93 FL (ref 82–98)
MCV RBC AUTO: 94 FL (ref 82–98)
MCV RBC AUTO: 96 FL (ref 82–98)
MCV RBC AUTO: 96 FL (ref 82–98)
MCV RBC AUTO: 97 FL (ref 82–98)
MCV RBC AUTO: 98 FL (ref 82–98)
MCV RBC AUTO: 99 FL (ref 82–98)
METAMYELOCYTES NFR BLD MANUAL: 1 % (ref 0–1)
MONOCYTES # BLD AUTO: 0.38 THOUSAND/ΜL (ref 0.17–1.22)
MONOCYTES # BLD AUTO: 0.4 THOUSAND/UL (ref 0–1.22)
MONOCYTES # BLD AUTO: 0.42 THOUSAND/ΜL (ref 0.17–1.22)
MONOCYTES # BLD AUTO: 0.53 THOUSAND/UL (ref 0–1.22)
MONOCYTES # BLD AUTO: 0.57 THOUSAND/UL (ref 0–1.22)
MONOCYTES # BLD AUTO: 0.57 THOUSAND/ΜL (ref 0.17–1.22)
MONOCYTES # BLD AUTO: 0.61 THOUSAND/ΜL (ref 0.17–1.22)
MONOCYTES # BLD AUTO: 0.65 THOUSAND/ΜL (ref 0.17–1.22)
MONOCYTES # BLD AUTO: 0.68 THOUSAND/ΜL (ref 0.17–1.22)
MONOCYTES # BLD AUTO: 0.83 THOUSAND/UL (ref 0–1.22)
MONOCYTES # BLD AUTO: 0.87 THOUSAND/ΜL (ref 0.17–1.22)
MONOCYTES # BLD AUTO: 0.93 THOUSAND/ΜL (ref 0.17–1.22)
MONOCYTES NFR BLD AUTO: 10 % (ref 4–12)
MONOCYTES NFR BLD AUTO: 12 % (ref 4–12)
MONOCYTES NFR BLD AUTO: 13 % (ref 4–12)
MONOCYTES NFR BLD AUTO: 5 % (ref 4–12)
MONOCYTES NFR BLD AUTO: 7 % (ref 4–12)
MONOCYTES NFR BLD AUTO: 7 % (ref 4–12)
MONOCYTES NFR BLD AUTO: 8 % (ref 4–12)
MONOCYTES NFR BLD AUTO: 9 % (ref 4–12)
MONOCYTES NFR BLD: 2 % (ref 4–12)
MONOCYTES NFR BLD: 4 % (ref 4–12)
MYELOCYTES NFR BLD MANUAL: 1 % (ref 0–1)
NEUTROPHILS # BLD AUTO: 3.12 THOUSANDS/ΜL (ref 1.85–7.62)
NEUTROPHILS # BLD AUTO: 3.35 THOUSANDS/ΜL (ref 1.85–7.62)
NEUTROPHILS # BLD AUTO: 3.76 THOUSANDS/ΜL (ref 1.85–7.62)
NEUTROPHILS # BLD AUTO: 3.84 THOUSANDS/ΜL (ref 1.85–7.62)
NEUTROPHILS # BLD AUTO: 3.94 THOUSANDS/ΜL (ref 1.85–7.62)
NEUTROPHILS # BLD AUTO: 4.19 THOUSANDS/ΜL (ref 1.85–7.62)
NEUTROPHILS # BLD AUTO: 6.66 THOUSANDS/ΜL (ref 1.85–7.62)
NEUTROPHILS # BLD AUTO: 6.81 THOUSANDS/ΜL (ref 1.85–7.62)
NEUTROPHILS # BLD MANUAL: 12.12 THOUSAND/UL (ref 1.85–7.62)
NEUTROPHILS # BLD MANUAL: 19.01 THOUSAND/UL (ref 1.85–7.62)
NEUTS BAND NFR BLD MANUAL: 0 % (ref 0–8)
NEUTS BAND NFR BLD MANUAL: 2 % (ref 0–8)
NEUTS BAND NFR BLD MANUAL: 2 % (ref 0–8)
NEUTS BAND NFR BLD MANUAL: 3 % (ref 0–8)
NEUTS SEG # BLD: 10 THOUSAND/UL (ref 1.81–6.82)
NEUTS SEG # BLD: 8.69 THOUSAND/UL (ref 1.81–6.82)
NEUTS SEG NFR BLD AUTO: 61 % (ref 43–75)
NEUTS SEG NFR BLD AUTO: 63 % (ref 43–75)
NEUTS SEG NFR BLD AUTO: 65 % (ref 43–75)
NEUTS SEG NFR BLD AUTO: 65 % (ref 43–75)
NEUTS SEG NFR BLD AUTO: 66 % (ref 43–75)
NEUTS SEG NFR BLD AUTO: 72 % (ref 43–75)
NEUTS SEG NFR BLD AUTO: 74 % (ref 43–75)
NEUTS SEG NFR BLD AUTO: 77 % (ref 43–75)
NEUTS SEG NFR BLD AUTO: 82 % (ref 43–75)
NEUTS SEG NFR BLD AUTO: 82 % (ref 43–75)
NEUTS SEG NFR BLD AUTO: 83 % (ref 43–75)
NEUTS SEG NFR BLD AUTO: 91 % (ref 43–75)
NITRITE UR QL STRIP: NEGATIVE
NON-SQ EPI CELLS URNS QL MICRO: ABNORMAL /HPF
NRBC BLD AUTO-RTO: 0 /100 WBCS
P AXIS: 56 DEGREES
PCO2 BLD: 18.3 MM HG (ref 36–44)
PCO2 BLD: 20 MMOL/L (ref 21–32)
PCO2 BLD: 22.5 MM HG (ref 36–44)
PCO2 BLD: 40.3 MM HG (ref 36–44)
PCO2 BLD: 7 MMOL/L (ref 21–32)
PCO2 BLD: 8 MMOL/L (ref 21–32)
PH BLD: 7.03 [PH] (ref 7.35–7.45)
PH BLD: 7.2 [PH] (ref 7.35–7.45)
PH BLD: 7.28 [PH] (ref 7.35–7.45)
PH UR STRIP.AUTO: 5 [PH] (ref 4.5–8)
PHOSPHATE SERPL-MCNC: 6.3 MG/DL (ref 2.3–4.1)
PHOSPHATE SERPL-MCNC: 7.7 MG/DL (ref 2.3–4.1)
PLATELET # BLD AUTO: 137 THOUSANDS/UL (ref 149–390)
PLATELET # BLD AUTO: 196 THOUSANDS/UL (ref 149–390)
PLATELET # BLD AUTO: 225 THOUSANDS/UL (ref 149–390)
PLATELET # BLD AUTO: 226 THOUSANDS/UL (ref 149–390)
PLATELET # BLD AUTO: 228 THOUSANDS/UL (ref 149–390)
PLATELET # BLD AUTO: 230 THOUSANDS/UL (ref 149–390)
PLATELET # BLD AUTO: 237 THOUSANDS/UL (ref 149–390)
PLATELET # BLD AUTO: 263 THOUSANDS/UL (ref 149–390)
PLATELET # BLD AUTO: 272 THOUSANDS/UL (ref 149–390)
PLATELET # BLD AUTO: 274 THOUSANDS/UL (ref 149–390)
PLATELET # BLD AUTO: 285 THOUSANDS/UL (ref 149–390)
PLATELET # BLD AUTO: 341 THOUSANDS/UL (ref 149–390)
PLATELET # BLD AUTO: 341 THOUSANDS/UL (ref 149–390)
PLATELET # BLD AUTO: 343 THOUSANDS/UL (ref 149–390)
PLATELET BLD QL SMEAR: ABNORMAL
PLATELET BLD QL SMEAR: ADEQUATE
PMV BLD AUTO: 10.1 FL (ref 8.9–12.7)
PMV BLD AUTO: 10.1 FL (ref 8.9–12.7)
PMV BLD AUTO: 10.2 FL (ref 8.9–12.7)
PMV BLD AUTO: 10.2 FL (ref 8.9–12.7)
PMV BLD AUTO: 6.4 FL (ref 8.9–12.7)
PMV BLD AUTO: 6.6 FL (ref 8.9–12.7)
PMV BLD AUTO: 8.7 FL (ref 8.9–12.7)
PMV BLD AUTO: 8.9 FL (ref 8.9–12.7)
PMV BLD AUTO: 9.1 FL (ref 8.9–12.7)
PMV BLD AUTO: 9.3 FL (ref 8.9–12.7)
PMV BLD AUTO: 9.4 FL (ref 8.9–12.7)
PMV BLD AUTO: 9.4 FL (ref 8.9–12.7)
PMV BLD AUTO: 9.6 FL (ref 8.9–12.7)
PMV BLD AUTO: 9.7 FL (ref 8.9–12.7)
PO2 BLD: 356 MM HG (ref 75–129)
PO2 BLD: 78 MM HG (ref 75–129)
PO2 BLD: 91 MM HG (ref 75–129)
POIKILOCYTOSIS BLD QL SMEAR: PRESENT
POIKILOCYTOSIS BLD QL SMEAR: PRESENT
POLYCHROMASIA BLD QL SMEAR: PRESENT
POTASSIUM BLD-SCNC: 2.6 MMOL/L (ref 3.5–5.3)
POTASSIUM BLD-SCNC: 3.8 MMOL/L (ref 3.5–5.3)
POTASSIUM BLD-SCNC: 3.9 MMOL/L (ref 3.5–5.3)
POTASSIUM SERPL-SCNC: 3.6 MMOL/L (ref 3.5–5.3)
POTASSIUM SERPL-SCNC: 3.7 MMOL/L (ref 3.5–5.3)
POTASSIUM SERPL-SCNC: 3.8 MMOL/L (ref 3.5–5.3)
POTASSIUM SERPL-SCNC: 3.8 MMOL/L (ref 3.5–5.3)
POTASSIUM SERPL-SCNC: 3.9 MMOL/L (ref 3.5–5.3)
POTASSIUM SERPL-SCNC: 4.3 MMOL/L (ref 3.5–5.3)
POTASSIUM SERPL-SCNC: 4.3 MMOL/L (ref 3.5–5.3)
POTASSIUM SERPL-SCNC: 4.7 MMOL/L (ref 3.5–5.3)
POTASSIUM SERPL-SCNC: 5.1 MMOL/L (ref 3.5–5.3)
POTASSIUM SERPL-SCNC: 5.2 MMOL/L (ref 3.5–5.3)
PR INTERVAL: 148 MS
PROT SERPL-MCNC: 4.9 G/DL (ref 6.4–8.2)
PROT SERPL-MCNC: 6.4 G/DL (ref 6.4–8.2)
PROT SERPL-MCNC: 6.6 G/DL (ref 6.4–8.2)
PROT SERPL-MCNC: 6.7 G/DL (ref 6.4–8.2)
PROT SERPL-MCNC: 6.8 G/DL (ref 6.4–8.2)
PROT SERPL-MCNC: 6.9 G/DL (ref 6.4–8.2)
PROT SERPL-MCNC: 7 G/DL (ref 6.4–8.2)
PROT SERPL-MCNC: 7.1 G/DL (ref 6.4–8.2)
PROT SERPL-MCNC: 7.3 G/DL (ref 6.4–8.2)
PROT SERPL-MCNC: 7.4 G/DL (ref 6.4–8.2)
PROT UR STRIP-MCNC: ABNORMAL MG/DL
PROT UR-MCNC: 19 MG/DL
PROT UR-MCNC: 46 MG/DL
PROTHROMBIN TIME: 13.9 SECONDS (ref 12–14.3)
PROTHROMBIN TIME: 15.4 SECONDS (ref 12.1–14.4)
PROTHROMBIN TIME: 19.7 SECONDS (ref 12.1–14.4)
PROTHROMBIN TIME: 42.3 SECONDS (ref 12.1–14.4)
PROTHROMBIN TIME: 58.7 SECONDS (ref 12.1–14.4)
QRS AXIS: 65 DEGREES
QRSD INTERVAL: 76 MS
QT INTERVAL: 376 MS
QTC INTERVAL: 492 MS
RBC # BLD AUTO: 3.2 MILLION/UL (ref 3.81–5.12)
RBC # BLD AUTO: 3.76 MILLION/UL (ref 3.81–5.12)
RBC # BLD AUTO: 3.98 MILLION/UL (ref 3.81–5.12)
RBC # BLD AUTO: 3.99 MILLION/UL (ref 3.81–5.12)
RBC # BLD AUTO: 4.09 MILLION/UL (ref 3.81–5.12)
RBC # BLD AUTO: 4.2 MILLION/UL (ref 3.81–5.12)
RBC # BLD AUTO: 4.23 MILLION/UL (ref 3.81–5.12)
RBC # BLD AUTO: 4.25 MILLION/UL (ref 3.81–5.12)
RBC # BLD AUTO: 4.28 MILLION/UL (ref 3.81–5.12)
RBC # BLD AUTO: 4.42 MILLION/UL (ref 3.81–5.12)
RBC # BLD AUTO: 4.49 MILLION/UL (ref 3.81–5.12)
RBC # BLD AUTO: 4.62 MILLION/UL (ref 3.81–5.12)
RBC # BLD AUTO: 4.67 MILLION/UL (ref 3.81–5.12)
RBC # BLD AUTO: 4.71 MILLION/UL (ref 3.81–5.12)
RBC #/AREA URNS AUTO: ABNORMAL /HPF
RBC MORPH BLD: NORMAL
RBC MORPH BLD: PRESENT
RBC MORPH BLD: PRESENT
RH BLD: NEGATIVE
RH BLD: NEGATIVE
SAO2 % BLD FROM PO2: 100 % (ref 95–98)
SAO2 % BLD FROM PO2: 88 % (ref 95–98)
SAO2 % BLD FROM PO2: 95 % (ref 95–98)
SODIUM 24H UR-SCNC: 12 MOL/L
SODIUM BLD-SCNC: 142 MMOL/L (ref 136–145)
SODIUM BLD-SCNC: 146 MMOL/L (ref 136–145)
SODIUM BLD-SCNC: 151 MMOL/L (ref 136–145)
SODIUM SERPL-SCNC: 136 MMOL/L (ref 136–145)
SODIUM SERPL-SCNC: 138 MMOL/L (ref 136–145)
SODIUM SERPL-SCNC: 140 MMOL/L (ref 136–145)
SODIUM SERPL-SCNC: 140 MMOL/L (ref 136–145)
SODIUM SERPL-SCNC: 141 MMOL/L (ref 136–145)
SODIUM SERPL-SCNC: 142 MMOL/L (ref 136–145)
SODIUM SERPL-SCNC: 142 MMOL/L (ref 136–145)
SODIUM SERPL-SCNC: 143 MMOL/L (ref 136–145)
SODIUM SERPL-SCNC: 145 MMOL/L (ref 136–145)
SODIUM SERPL-SCNC: 148 MMOL/L (ref 136–145)
SP GR UR STRIP.AUTO: >1.045 (ref 1–1.03)
SPECIMEN EXPIRATION DATE: NORMAL
SPECIMEN SOURCE: ABNORMAL
T WAVE AXIS: 92 DEGREES
T4 FREE SERPL-MCNC: 1.29 NG/DL (ref 0.76–1.46)
TOTAL CELLS COUNTED SPEC: 100
TOTAL CELLS COUNTED SPEC: 100
TROPONIN I SERPL-MCNC: <0.02 NG/ML
TSH SERPL DL<=0.05 MIU/L-ACNC: 1.89 UIU/ML (ref 0.36–3.74)
TSH SERPL DL<=0.05 MIU/L-ACNC: 2.01 UIU/ML (ref 0.36–3.74)
TSH SERPL DL<=0.05 MIU/L-ACNC: 5.28 UIU/ML (ref 0.36–3.74)
TSH SERPL DL<=0.05 MIU/L-ACNC: 5.67 UIU/ML (ref 0.36–3.74)
UROBILINOGEN UR QL STRIP.AUTO: 0.2 E.U./DL
UUN 24H UR-MCNC: 215 MG/DL
VENTRICULAR RATE: 103 BPM
WBC # BLD AUTO: 10.6 THOUSAND/UL (ref 4.31–10.16)
WBC # BLD AUTO: 11.9 THOUSAND/UL (ref 4.31–10.16)
WBC # BLD AUTO: 14.26 THOUSAND/UL (ref 4.31–10.16)
WBC # BLD AUTO: 20.22 THOUSAND/UL (ref 4.31–10.16)
WBC # BLD AUTO: 5.08 THOUSAND/UL (ref 4.31–10.16)
WBC # BLD AUTO: 5.23 THOUSAND/UL (ref 4.31–10.16)
WBC # BLD AUTO: 5.28 THOUSAND/UL (ref 4.31–10.16)
WBC # BLD AUTO: 5.78 THOUSAND/UL (ref 4.31–10.16)
WBC # BLD AUTO: 5.93 THOUSAND/UL (ref 4.31–10.16)
WBC # BLD AUTO: 6.48 THOUSAND/UL (ref 4.31–10.16)
WBC # BLD AUTO: 7.62 THOUSAND/UL (ref 4.31–10.16)
WBC # BLD AUTO: 8.47 THOUSAND/UL (ref 4.31–10.16)
WBC # BLD AUTO: 8.7 THOUSAND/UL (ref 4.31–10.16)
WBC # BLD AUTO: 9.2 THOUSAND/UL (ref 4.31–10.16)
WBC #/AREA URNS AUTO: ABNORMAL /HPF
WBC NRBC COR # BLD: 10.6 THOUSAND/UL (ref 4.31–10.16)
WBC NRBC COR # BLD: 11.9 THOUSAND/UL (ref 4.31–10.16)

## 2017-01-01 PROCEDURE — 84443 ASSAY THYROID STIM HORMONE: CPT

## 2017-01-01 PROCEDURE — 74177 CT ABD & PELVIS W/CONTRAST: CPT

## 2017-01-01 PROCEDURE — 77336 RADIATION PHYSICS CONSULT: CPT | Performed by: RADIOLOGY

## 2017-01-01 PROCEDURE — 96361 HYDRATE IV INFUSION ADD-ON: CPT

## 2017-01-01 PROCEDURE — 70450 CT HEAD/BRAIN W/O DYE: CPT

## 2017-01-01 PROCEDURE — 84132 ASSAY OF SERUM POTASSIUM: CPT

## 2017-01-01 PROCEDURE — 85007 BL SMEAR W/DIFF WBC COUNT: CPT

## 2017-01-01 PROCEDURE — 85610 PROTHROMBIN TIME: CPT | Performed by: SURGERY

## 2017-01-01 PROCEDURE — 84100 ASSAY OF PHOSPHORUS: CPT | Performed by: PHYSICIAN ASSISTANT

## 2017-01-01 PROCEDURE — 84439 ASSAY OF FREE THYROXINE: CPT

## 2017-01-01 PROCEDURE — G8988 SELF CARE GOAL STATUS: HCPCS

## 2017-01-01 PROCEDURE — 85007 BL SMEAR W/DIFF WBC COUNT: CPT | Performed by: PHYSICIAN ASSISTANT

## 2017-01-01 PROCEDURE — 0W9D3ZZ DRAINAGE OF PERICARDIAL CAVITY, PERCUTANEOUS APPROACH: ICD-10-PCS | Performed by: EMERGENCY MEDICINE

## 2017-01-01 PROCEDURE — 36415 COLL VENOUS BLD VENIPUNCTURE: CPT | Performed by: EMERGENCY MEDICINE

## 2017-01-01 PROCEDURE — 85025 COMPLETE CBC W/AUTO DIFF WBC: CPT

## 2017-01-01 PROCEDURE — 93308 TTE F-UP OR LMTD: CPT

## 2017-01-01 PROCEDURE — 70553 MRI BRAIN STEM W/O & W/DYE: CPT

## 2017-01-01 PROCEDURE — 86923 COMPATIBILITY TEST ELECTRIC: CPT

## 2017-01-01 PROCEDURE — 84156 ASSAY OF PROTEIN URINE: CPT | Performed by: INTERNAL MEDICINE

## 2017-01-01 PROCEDURE — 05HM33Z INSERTION OF INFUSION DEVICE INTO RIGHT INTERNAL JUGULAR VEIN, PERCUTANEOUS APPROACH: ICD-10-PCS | Performed by: ANESTHESIOLOGY

## 2017-01-01 PROCEDURE — 77412 RADIATION TX DELIVERY LVL 3: CPT | Performed by: RADIOLOGY

## 2017-01-01 PROCEDURE — 80053 COMPREHEN METABOLIC PANEL: CPT

## 2017-01-01 PROCEDURE — 83735 ASSAY OF MAGNESIUM: CPT | Performed by: SURGERY

## 2017-01-01 PROCEDURE — 0W9D00Z DRAINAGE OF PERICARDIAL CAVITY WITH DRAINAGE DEVICE, OPEN APPROACH: ICD-10-PCS | Performed by: THORACIC SURGERY (CARDIOTHORACIC VASCULAR SURGERY)

## 2017-01-01 PROCEDURE — 83735 ASSAY OF MAGNESIUM: CPT | Performed by: INTERNAL MEDICINE

## 2017-01-01 PROCEDURE — 36415 COLL VENOUS BLD VENIPUNCTURE: CPT

## 2017-01-01 PROCEDURE — 76770 US EXAM ABDO BACK WALL COMP: CPT

## 2017-01-01 PROCEDURE — 85730 THROMBOPLASTIN TIME PARTIAL: CPT | Performed by: PHYSICIAN ASSISTANT

## 2017-01-01 PROCEDURE — 85027 COMPLETE CBC AUTOMATED: CPT | Performed by: SURGERY

## 2017-01-01 PROCEDURE — 82803 BLOOD GASES ANY COMBINATION: CPT

## 2017-01-01 PROCEDURE — 99213 OFFICE O/P EST LOW 20 MIN: CPT | Performed by: RADIOLOGY

## 2017-01-01 PROCEDURE — 80048 BASIC METABOLIC PNL TOTAL CA: CPT | Performed by: INTERNAL MEDICINE

## 2017-01-01 PROCEDURE — 82330 ASSAY OF CALCIUM: CPT | Performed by: SURGERY

## 2017-01-01 PROCEDURE — A9585 GADOBUTROL INJECTION: HCPCS | Performed by: RADIOLOGY

## 2017-01-01 PROCEDURE — 86850 RBC ANTIBODY SCREEN: CPT | Performed by: EMERGENCY MEDICINE

## 2017-01-01 PROCEDURE — 83605 ASSAY OF LACTIC ACID: CPT | Performed by: PHYSICIAN ASSISTANT

## 2017-01-01 PROCEDURE — 83605 ASSAY OF LACTIC ACID: CPT | Performed by: EMERGENCY MEDICINE

## 2017-01-01 PROCEDURE — 96375 TX/PRO/DX INJ NEW DRUG ADDON: CPT

## 2017-01-01 PROCEDURE — G8980 MOBILITY D/C STATUS: HCPCS

## 2017-01-01 PROCEDURE — 71260 CT THORAX DX C+: CPT

## 2017-01-01 PROCEDURE — 80053 COMPREHEN METABOLIC PANEL: CPT | Performed by: EMERGENCY MEDICINE

## 2017-01-01 PROCEDURE — 80053 COMPREHEN METABOLIC PANEL: CPT | Performed by: INTERNAL MEDICINE

## 2017-01-01 PROCEDURE — 96367 TX/PROPH/DG ADDL SEQ IV INF: CPT

## 2017-01-01 PROCEDURE — 88305 TISSUE EXAM BY PATHOLOGIST: CPT | Performed by: STUDENT IN AN ORGANIZED HEALTH CARE EDUCATION/TRAINING PROGRAM

## 2017-01-01 PROCEDURE — 81001 URINALYSIS AUTO W/SCOPE: CPT | Performed by: INTERNAL MEDICINE

## 2017-01-01 PROCEDURE — 77331 SPECIAL RADIATION DOSIMETRY: CPT | Performed by: RADIOLOGY

## 2017-01-01 PROCEDURE — 84295 ASSAY OF SERUM SODIUM: CPT

## 2017-01-01 PROCEDURE — 77285 THER RAD SIMULAJ FIELD INTRM: CPT | Performed by: RADIOLOGY

## 2017-01-01 PROCEDURE — 30233N1 TRANSFUSION OF NONAUTOLOGOUS RED BLOOD CELLS INTO PERIPHERAL VEIN, PERCUTANEOUS APPROACH: ICD-10-PCS | Performed by: HOSPITALIST

## 2017-01-01 PROCEDURE — 86900 BLOOD TYPING SEROLOGIC ABO: CPT | Performed by: EMERGENCY MEDICINE

## 2017-01-01 PROCEDURE — 84300 ASSAY OF URINE SODIUM: CPT | Performed by: INTERNAL MEDICINE

## 2017-01-01 PROCEDURE — 80053 COMPREHEN METABOLIC PANEL: CPT | Performed by: PHYSICIAN ASSISTANT

## 2017-01-01 PROCEDURE — 84484 ASSAY OF TROPONIN QUANT: CPT | Performed by: EMERGENCY MEDICINE

## 2017-01-01 PROCEDURE — G8989 SELF CARE D/C STATUS: HCPCS

## 2017-01-01 PROCEDURE — 71010 HB CHEST X-RAY 1 VIEW FRONTAL (PORTABLE): CPT

## 2017-01-01 PROCEDURE — 85027 COMPLETE CBC AUTOMATED: CPT | Performed by: INTERNAL MEDICINE

## 2017-01-01 PROCEDURE — 97162 PT EVAL MOD COMPLEX 30 MIN: CPT

## 2017-01-01 PROCEDURE — 85610 PROTHROMBIN TIME: CPT | Performed by: EMERGENCY MEDICINE

## 2017-01-01 PROCEDURE — 84540 ASSAY OF URINE/UREA-N: CPT | Performed by: INTERNAL MEDICINE

## 2017-01-01 PROCEDURE — 86900 BLOOD TYPING SEROLOGIC ABO: CPT | Performed by: PHYSICIAN ASSISTANT

## 2017-01-01 PROCEDURE — G8979 MOBILITY GOAL STATUS: HCPCS

## 2017-01-01 PROCEDURE — 77290 THER RAD SIMULAJ FIELD CPLX: CPT | Performed by: RADIOLOGY

## 2017-01-01 PROCEDURE — 96413 CHEMO IV INFUSION 1 HR: CPT

## 2017-01-01 PROCEDURE — 77280 THER RAD SIMULAJ FIELD SMPL: CPT | Performed by: RADIOLOGY

## 2017-01-01 PROCEDURE — 97166 OT EVAL MOD COMPLEX 45 MIN: CPT

## 2017-01-01 PROCEDURE — 86850 RBC ANTIBODY SCREEN: CPT | Performed by: PHYSICIAN ASSISTANT

## 2017-01-01 PROCEDURE — 96376 TX/PRO/DX INJ SAME DRUG ADON: CPT

## 2017-01-01 PROCEDURE — 83735 ASSAY OF MAGNESIUM: CPT | Performed by: PHYSICIAN ASSISTANT

## 2017-01-01 PROCEDURE — 85027 COMPLETE CBC AUTOMATED: CPT | Performed by: PHYSICIAN ASSISTANT

## 2017-01-01 PROCEDURE — 96365 THER/PROPH/DIAG IV INF INIT: CPT

## 2017-01-01 PROCEDURE — 83605 ASSAY OF LACTIC ACID: CPT | Performed by: INTERNAL MEDICINE

## 2017-01-01 PROCEDURE — 80048 BASIC METABOLIC PNL TOTAL CA: CPT | Performed by: SURGERY

## 2017-01-01 PROCEDURE — 82330 ASSAY OF CALCIUM: CPT

## 2017-01-01 PROCEDURE — P9038 RBC IRRADIATED: HCPCS

## 2017-01-01 PROCEDURE — 82948 REAGENT STRIP/BLOOD GLUCOSE: CPT

## 2017-01-01 PROCEDURE — 85610 PROTHROMBIN TIME: CPT | Performed by: PHYSICIAN ASSISTANT

## 2017-01-01 PROCEDURE — G8987 SELF CARE CURRENT STATUS: HCPCS

## 2017-01-01 PROCEDURE — 99285 EMERGENCY DEPT VISIT HI MDM: CPT

## 2017-01-01 PROCEDURE — 93306 TTE W/DOPPLER COMPLETE: CPT

## 2017-01-01 PROCEDURE — 93312 ECHO TRANSESOPHAGEAL: CPT

## 2017-01-01 PROCEDURE — 83735 ASSAY OF MAGNESIUM: CPT | Performed by: EMERGENCY MEDICINE

## 2017-01-01 PROCEDURE — 77300 RADIATION THERAPY DOSE PLAN: CPT | Performed by: RADIOLOGY

## 2017-01-01 PROCEDURE — 77334 RADIATION TREATMENT AID(S): CPT | Performed by: RADIOLOGY

## 2017-01-01 PROCEDURE — 77295 3-D RADIOTHERAPY PLAN: CPT | Performed by: RADIOLOGY

## 2017-01-01 PROCEDURE — 86901 BLOOD TYPING SEROLOGIC RH(D): CPT | Performed by: PHYSICIAN ASSISTANT

## 2017-01-01 PROCEDURE — 77014 HB CT SCAN FOR THERAPY GUIDE: CPT

## 2017-01-01 PROCEDURE — 93005 ELECTROCARDIOGRAM TRACING: CPT | Performed by: EMERGENCY MEDICINE

## 2017-01-01 PROCEDURE — 86901 BLOOD TYPING SEROLOGIC RH(D): CPT | Performed by: EMERGENCY MEDICINE

## 2017-01-01 PROCEDURE — 82436 ASSAY OF URINE CHLORIDE: CPT | Performed by: INTERNAL MEDICINE

## 2017-01-01 PROCEDURE — 82565 ASSAY OF CREATININE: CPT

## 2017-01-01 PROCEDURE — 87070 CULTURE OTHR SPECIMN AEROBIC: CPT | Performed by: SURGERY

## 2017-01-01 PROCEDURE — 85014 HEMATOCRIT: CPT

## 2017-01-01 PROCEDURE — 85730 THROMBOPLASTIN TIME PARTIAL: CPT | Performed by: INTERNAL MEDICINE

## 2017-01-01 PROCEDURE — P9016 RBC LEUKOCYTES REDUCED: HCPCS

## 2017-01-01 PROCEDURE — 84443 ASSAY THYROID STIM HORMONE: CPT | Performed by: INTERNAL MEDICINE

## 2017-01-01 PROCEDURE — 76376 3D RENDER W/INTRP POSTPROCES: CPT

## 2017-01-01 PROCEDURE — 85730 THROMBOPLASTIN TIME PARTIAL: CPT | Performed by: EMERGENCY MEDICINE

## 2017-01-01 PROCEDURE — G8978 MOBILITY CURRENT STATUS: HCPCS

## 2017-01-01 PROCEDURE — 84100 ASSAY OF PHOSPHORUS: CPT | Performed by: INTERNAL MEDICINE

## 2017-01-01 PROCEDURE — 94002 VENT MGMT INPAT INIT DAY: CPT

## 2017-01-01 PROCEDURE — 93005 ELECTROCARDIOGRAM TRACING: CPT

## 2017-01-01 PROCEDURE — 85025 COMPLETE CBC W/AUTO DIFF WBC: CPT | Performed by: EMERGENCY MEDICINE

## 2017-01-01 PROCEDURE — 85007 BL SMEAR W/DIFF WBC COUNT: CPT | Performed by: SURGERY

## 2017-01-01 PROCEDURE — 85025 COMPLETE CBC W/AUTO DIFF WBC: CPT | Performed by: INTERNAL MEDICINE

## 2017-01-01 PROCEDURE — 77387 GUIDANCE FOR RADJ TX DLVR: CPT | Performed by: RADIOLOGY

## 2017-01-01 PROCEDURE — 85027 COMPLETE CBC AUTOMATED: CPT

## 2017-01-01 PROCEDURE — 85610 PROTHROMBIN TIME: CPT | Performed by: INTERNAL MEDICINE

## 2017-01-01 PROCEDURE — 96374 THER/PROPH/DIAG INJ IV PUSH: CPT

## 2017-01-01 PROCEDURE — 84520 ASSAY OF UREA NITROGEN: CPT

## 2017-01-01 PROCEDURE — 83690 ASSAY OF LIPASE: CPT | Performed by: EMERGENCY MEDICINE

## 2017-01-01 PROCEDURE — A9585 GADOBUTROL INJECTION: HCPCS | Performed by: INTERNAL MEDICINE

## 2017-01-01 PROCEDURE — 82570 ASSAY OF URINE CREATININE: CPT | Performed by: INTERNAL MEDICINE

## 2017-01-01 PROCEDURE — 82947 ASSAY GLUCOSE BLOOD QUANT: CPT

## 2017-01-01 RX ORDER — SODIUM CHLORIDE 9 MG/ML
20 INJECTION, SOLUTION INTRAVENOUS CONTINUOUS
Status: DISPENSED | OUTPATIENT
Start: 2017-01-01 | End: 2017-01-01

## 2017-01-01 RX ORDER — SODIUM CHLORIDE, SODIUM LACTATE, POTASSIUM CHLORIDE, CALCIUM CHLORIDE 600; 310; 30; 20 MG/100ML; MG/100ML; MG/100ML; MG/100ML
125 INJECTION, SOLUTION INTRAVENOUS CONTINUOUS
Status: DISCONTINUED | OUTPATIENT
Start: 2017-01-01 | End: 2017-01-01 | Stop reason: HOSPADM

## 2017-01-01 RX ORDER — SODIUM CHLORIDE, SODIUM LACTATE, POTASSIUM CHLORIDE, CALCIUM CHLORIDE 600; 310; 30; 20 MG/100ML; MG/100ML; MG/100ML; MG/100ML
75 INJECTION, SOLUTION INTRAVENOUS CONTINUOUS
Status: DISCONTINUED | OUTPATIENT
Start: 2017-01-01 | End: 2017-01-01 | Stop reason: HOSPADM

## 2017-01-01 RX ORDER — CEFAZOLIN SODIUM 1 G/3ML
INJECTION, POWDER, FOR SOLUTION INTRAMUSCULAR; INTRAVENOUS AS NEEDED
Status: DISCONTINUED | OUTPATIENT
Start: 2017-01-01 | End: 2017-01-01 | Stop reason: SURG

## 2017-01-01 RX ORDER — HEPARIN SODIUM 5000 [USP'U]/ML
5000 INJECTION, SOLUTION INTRAVENOUS; SUBCUTANEOUS EVERY 8 HOURS SCHEDULED
Status: DISCONTINUED | OUTPATIENT
Start: 2017-01-01 | End: 2017-01-01

## 2017-01-01 RX ORDER — METOPROLOL TARTRATE 50 MG/1
50 TABLET, FILM COATED ORAL EVERY 12 HOURS SCHEDULED
Status: DISCONTINUED | OUTPATIENT
Start: 2017-01-01 | End: 2017-01-01 | Stop reason: HOSPADM

## 2017-01-01 RX ORDER — ACETAMINOPHEN 325 MG/1
650 TABLET ORAL EVERY 6 HOURS PRN
Status: DISCONTINUED | OUTPATIENT
Start: 2017-01-01 | End: 2017-01-01

## 2017-01-01 RX ORDER — LABETALOL HYDROCHLORIDE 5 MG/ML
INJECTION, SOLUTION INTRAVENOUS
Status: COMPLETED
Start: 2017-01-01 | End: 2017-01-01

## 2017-01-01 RX ORDER — FENTANYL CITRATE 50 UG/ML
25 INJECTION, SOLUTION INTRAMUSCULAR; INTRAVENOUS ONCE
Status: COMPLETED | OUTPATIENT
Start: 2017-01-01 | End: 2017-01-01

## 2017-01-01 RX ORDER — LISINOPRIL 5 MG/1
20 TABLET ORAL ONCE
Status: COMPLETED | OUTPATIENT
Start: 2017-01-01 | End: 2017-01-01

## 2017-01-01 RX ORDER — ONDANSETRON 2 MG/ML
4 INJECTION INTRAMUSCULAR; INTRAVENOUS ONCE
Status: COMPLETED | OUTPATIENT
Start: 2017-01-01 | End: 2017-01-01

## 2017-01-01 RX ORDER — LISINOPRIL 10 MG/1
10 TABLET ORAL DAILY
Status: DISCONTINUED | OUTPATIENT
Start: 2017-01-01 | End: 2017-01-01

## 2017-01-01 RX ORDER — ACETAMINOPHEN 325 MG/1
650 TABLET ORAL EVERY 6 HOURS PRN
Status: DISCONTINUED | OUTPATIENT
Start: 2017-01-01 | End: 2017-01-01 | Stop reason: SDUPTHER

## 2017-01-01 RX ORDER — MIDAZOLAM HYDROCHLORIDE 1 MG/ML
INJECTION INTRAMUSCULAR; INTRAVENOUS AS NEEDED
Status: DISCONTINUED | OUTPATIENT
Start: 2017-01-01 | End: 2017-01-01 | Stop reason: SURG

## 2017-01-01 RX ORDER — ACETAMINOPHEN 325 MG/1
650 TABLET ORAL EVERY 6 HOURS PRN
Status: DISCONTINUED | OUTPATIENT
Start: 2017-01-01 | End: 2017-01-01 | Stop reason: HOSPADM

## 2017-01-01 RX ORDER — PROPOFOL 10 MG/ML
INJECTION, EMULSION INTRAVENOUS AS NEEDED
Status: DISCONTINUED | OUTPATIENT
Start: 2017-01-01 | End: 2017-01-01 | Stop reason: SURG

## 2017-01-01 RX ORDER — ONDANSETRON 2 MG/ML
4 INJECTION INTRAMUSCULAR; INTRAVENOUS EVERY 8 HOURS PRN
Status: DISCONTINUED | OUTPATIENT
Start: 2017-01-01 | End: 2017-01-01 | Stop reason: HOSPADM

## 2017-01-01 RX ORDER — LIDOCAINE HYDROCHLORIDE AND EPINEPHRINE 10; 10 MG/ML; UG/ML
INJECTION, SOLUTION INFILTRATION; PERINEURAL AS NEEDED
Status: DISCONTINUED | OUTPATIENT
Start: 2017-01-01 | End: 2017-01-01 | Stop reason: HOSPADM

## 2017-01-01 RX ORDER — ATROPINE SULFATE 0.4 MG/ML
INJECTION, SOLUTION ENDOTRACHEAL; INTRAMEDULLARY; INTRAMUSCULAR; INTRAVENOUS; SUBCUTANEOUS AS NEEDED
Status: DISCONTINUED | OUTPATIENT
Start: 2017-01-01 | End: 2017-01-01 | Stop reason: SURG

## 2017-01-01 RX ORDER — LORAZEPAM 0.5 MG/1
0.5 TABLET ORAL EVERY 6 HOURS PRN
Status: DISCONTINUED | OUTPATIENT
Start: 2017-01-01 | End: 2017-01-01 | Stop reason: HOSPADM

## 2017-01-01 RX ORDER — DEXTROSE MONOHYDRATE 25 G/50ML
INJECTION, SOLUTION INTRAVENOUS AS NEEDED
Status: DISCONTINUED | OUTPATIENT
Start: 2017-01-01 | End: 2017-01-01 | Stop reason: SURG

## 2017-01-01 RX ORDER — MIRTAZAPINE 15 MG/1
7.5 TABLET, FILM COATED ORAL
Status: DISCONTINUED | OUTPATIENT
Start: 2017-01-01 | End: 2017-01-01 | Stop reason: HOSPADM

## 2017-01-01 RX ORDER — MELATONIN
1000 DAILY
Status: DISCONTINUED | OUTPATIENT
Start: 2017-01-01 | End: 2017-01-01

## 2017-01-01 RX ORDER — LORAZEPAM 2 MG/ML
2 INJECTION INTRAMUSCULAR
Status: DISCONTINUED | OUTPATIENT
Start: 2017-01-01 | End: 2017-08-06 | Stop reason: HOSPADM

## 2017-01-01 RX ORDER — LISINOPRIL 10 MG/1
10 TABLET ORAL
COMMUNITY
End: 2017-01-01 | Stop reason: HOSPADM

## 2017-01-01 RX ORDER — PROPOFOL 10 MG/ML
INJECTION, EMULSION INTRAVENOUS CONTINUOUS PRN
Status: DISCONTINUED | OUTPATIENT
Start: 2017-01-01 | End: 2017-01-01 | Stop reason: SURG

## 2017-01-01 RX ORDER — METOPROLOL TARTRATE 50 MG/1
50 TABLET, FILM COATED ORAL EVERY 12 HOURS SCHEDULED
Status: DISCONTINUED | OUTPATIENT
Start: 2017-01-01 | End: 2017-01-01

## 2017-01-01 RX ORDER — LEVETIRACETAM 500 MG/1
500 TABLET ORAL EVERY 12 HOURS SCHEDULED
Status: DISCONTINUED | OUTPATIENT
Start: 2017-01-01 | End: 2017-01-01 | Stop reason: HOSPADM

## 2017-01-01 RX ORDER — ALBUMIN, HUMAN INJ 5% 5 %
SOLUTION INTRAVENOUS CONTINUOUS PRN
Status: DISCONTINUED | OUTPATIENT
Start: 2017-01-01 | End: 2017-01-01 | Stop reason: SURG

## 2017-01-01 RX ORDER — BUTALBITAL, ACETAMINOPHEN AND CAFFEINE 50; 325; 40 MG/1; MG/1; MG/1
1 TABLET ORAL EVERY 4 HOURS PRN
Status: DISCONTINUED | OUTPATIENT
Start: 2017-01-01 | End: 2017-01-01 | Stop reason: HOSPADM

## 2017-01-01 RX ORDER — DEXAMETHASONE SODIUM PHOSPHATE 4 MG/ML
2 INJECTION, SOLUTION INTRA-ARTICULAR; INTRALESIONAL; INTRAMUSCULAR; INTRAVENOUS; SOFT TISSUE 2 TIMES DAILY
Status: COMPLETED | OUTPATIENT
Start: 2017-01-01 | End: 2017-01-01

## 2017-01-01 RX ORDER — LORAZEPAM 0.5 MG/1
0.5 TABLET ORAL EVERY 6 HOURS PRN
Status: DISCONTINUED | OUTPATIENT
Start: 2017-01-01 | End: 2017-01-01

## 2017-01-01 RX ORDER — ONDANSETRON 2 MG/ML
INJECTION INTRAMUSCULAR; INTRAVENOUS
Status: DISCONTINUED
Start: 2017-01-01 | End: 2017-01-01 | Stop reason: HOSPADM

## 2017-01-01 RX ORDER — FENTANYL CITRATE 50 UG/ML
100 INJECTION, SOLUTION INTRAMUSCULAR; INTRAVENOUS
Status: DISCONTINUED | OUTPATIENT
Start: 2017-01-01 | End: 2017-08-06 | Stop reason: HOSPADM

## 2017-01-01 RX ORDER — HYDRALAZINE HYDROCHLORIDE 20 MG/ML
5 INJECTION INTRAMUSCULAR; INTRAVENOUS EVERY 6 HOURS PRN
Status: DISCONTINUED | OUTPATIENT
Start: 2017-01-01 | End: 2017-01-01 | Stop reason: HOSPADM

## 2017-01-01 RX ORDER — MAGNESIUM HYDROXIDE/ALUMINUM HYDROXICE/SIMETHICONE 120; 1200; 1200 MG/30ML; MG/30ML; MG/30ML
15 SUSPENSION ORAL EVERY 6 HOURS PRN
Status: DISCONTINUED | OUTPATIENT
Start: 2017-01-01 | End: 2017-01-01

## 2017-01-01 RX ORDER — LIDOCAINE HYDROCHLORIDE 10 MG/ML
INJECTION, SOLUTION EPIDURAL; INFILTRATION; INTRACAUDAL; PERINEURAL
Status: DISCONTINUED
Start: 2017-01-01 | End: 2017-08-06 | Stop reason: HOSPADM

## 2017-01-01 RX ORDER — SUCCINYLCHOLINE CHLORIDE 20 MG/ML
INJECTION INTRAMUSCULAR; INTRAVENOUS AS NEEDED
Status: DISCONTINUED | OUTPATIENT
Start: 2017-01-01 | End: 2017-01-01 | Stop reason: SURG

## 2017-01-01 RX ORDER — METOPROLOL TARTRATE 50 MG/1
50 TABLET, FILM COATED ORAL EVERY 12 HOURS SCHEDULED
Qty: 60 TABLET | Refills: 0 | Status: SHIPPED | OUTPATIENT
Start: 2017-01-01 | End: 2017-01-01

## 2017-01-01 RX ORDER — LISINOPRIL 10 MG/1
10 TABLET ORAL DAILY
COMMUNITY
End: 2017-08-08

## 2017-01-01 RX ORDER — HYDRALAZINE HYDROCHLORIDE 20 MG/ML
5 INJECTION INTRAMUSCULAR; INTRAVENOUS ONCE
Status: COMPLETED | OUTPATIENT
Start: 2017-01-01 | End: 2017-01-01

## 2017-01-01 RX ORDER — IBUPROFEN 600 MG/1
600 TABLET ORAL EVERY 6 HOURS PRN
Status: DISCONTINUED | OUTPATIENT
Start: 2017-01-01 | End: 2017-01-01 | Stop reason: HOSPADM

## 2017-01-01 RX ORDER — LISINOPRIL 20 MG/1
20 TABLET ORAL DAILY
Status: DISCONTINUED | OUTPATIENT
Start: 2017-01-01 | End: 2017-01-01 | Stop reason: HOSPADM

## 2017-01-01 RX ORDER — SODIUM CHLORIDE 9 MG/ML
20 INJECTION, SOLUTION INTRAVENOUS CONTINUOUS
Status: DISCONTINUED | OUTPATIENT
Start: 2017-01-01 | End: 2017-01-01 | Stop reason: HOSPADM

## 2017-01-01 RX ORDER — LABETALOL HYDROCHLORIDE 5 MG/ML
10 INJECTION, SOLUTION INTRAVENOUS ONCE
Status: COMPLETED | OUTPATIENT
Start: 2017-01-01 | End: 2017-01-01

## 2017-01-01 RX ORDER — MIRTAZAPINE 15 MG/1
7.5 TABLET, FILM COATED ORAL
COMMUNITY
End: 2017-01-01

## 2017-01-01 RX ORDER — GLYCOPYRROLATE 0.2 MG/ML
0.2 INJECTION INTRAMUSCULAR; INTRAVENOUS ONCE
Status: COMPLETED | OUTPATIENT
Start: 2017-01-01 | End: 2017-01-01

## 2017-01-01 RX ORDER — METOPROLOL SUCCINATE 50 MG/1
50 TABLET, EXTENDED RELEASE ORAL 2 TIMES DAILY
Status: DISCONTINUED | OUTPATIENT
Start: 2017-01-01 | End: 2017-01-01 | Stop reason: SDUPTHER

## 2017-01-01 RX ORDER — LORAZEPAM 2 MG/ML
1 INJECTION INTRAMUSCULAR ONCE
Status: COMPLETED | OUTPATIENT
Start: 2017-01-01 | End: 2017-01-01

## 2017-01-01 RX ORDER — ETOMIDATE 2 MG/ML
INJECTION INTRAVENOUS AS NEEDED
Status: DISCONTINUED | OUTPATIENT
Start: 2017-01-01 | End: 2017-01-01 | Stop reason: SURG

## 2017-01-01 RX ORDER — DEXAMETHASONE SODIUM PHOSPHATE 4 MG/ML
2 INJECTION, SOLUTION INTRA-ARTICULAR; INTRALESIONAL; INTRAMUSCULAR; INTRAVENOUS; SOFT TISSUE 2 TIMES DAILY
Status: DISCONTINUED | OUTPATIENT
Start: 2017-01-01 | End: 2017-01-01

## 2017-01-01 RX ORDER — ONDANSETRON 2 MG/ML
INJECTION INTRAMUSCULAR; INTRAVENOUS
Status: COMPLETED
Start: 2017-01-01 | End: 2017-01-01

## 2017-01-01 RX ORDER — ONDANSETRON 2 MG/ML
INJECTION INTRAMUSCULAR; INTRAVENOUS AS NEEDED
Status: DISCONTINUED | OUTPATIENT
Start: 2017-01-01 | End: 2017-01-01 | Stop reason: SURG

## 2017-01-01 RX ORDER — LORAZEPAM 0.5 MG/1
0.5 TABLET ORAL EVERY 6 HOURS PRN
COMMUNITY
End: 2017-08-08

## 2017-01-01 RX ORDER — VITAMIN E 268 MG
400 CAPSULE ORAL EVERY OTHER DAY
Status: DISCONTINUED | OUTPATIENT
Start: 2017-01-01 | End: 2017-01-01 | Stop reason: HOSPADM

## 2017-01-01 RX ORDER — CHLORHEXIDINE GLUCONATE 0.12 MG/ML
15 RINSE ORAL EVERY 12 HOURS SCHEDULED
Status: DISCONTINUED | OUTPATIENT
Start: 2017-01-01 | End: 2017-01-01

## 2017-01-01 RX ORDER — CHOLECALCIFEROL (VITAMIN D3) 10 MCG
1 TABLET ORAL DAILY
Status: DISCONTINUED | OUTPATIENT
Start: 2017-01-01 | End: 2017-01-01

## 2017-01-01 RX ORDER — VITAMIN E 268 MG
400 CAPSULE ORAL EVERY OTHER DAY
Status: DISCONTINUED | OUTPATIENT
Start: 2017-01-01 | End: 2017-01-01

## 2017-01-01 RX ORDER — FENTANYL CITRATE 50 UG/ML
50 INJECTION, SOLUTION INTRAMUSCULAR; INTRAVENOUS EVERY 2 HOUR PRN
Status: DISCONTINUED | OUTPATIENT
Start: 2017-01-01 | End: 2017-01-01

## 2017-01-01 RX ORDER — DEXAMETHASONE 2 MG/1
2 TABLET ORAL 2 TIMES DAILY
Status: DISCONTINUED | OUTPATIENT
Start: 2017-01-01 | End: 2017-01-01 | Stop reason: HOSPADM

## 2017-01-01 RX ORDER — METOPROLOL SUCCINATE 50 MG/1
50 TABLET, EXTENDED RELEASE ORAL 2 TIMES DAILY
COMMUNITY
End: 2017-08-08

## 2017-01-01 RX ORDER — LEVETIRACETAM 500 MG/1
500 TABLET ORAL EVERY 12 HOURS SCHEDULED
Qty: 28 TABLET | Refills: 0 | Status: SHIPPED | OUTPATIENT
Start: 2017-01-01 | End: 2017-01-01

## 2017-01-01 RX ORDER — DEXAMETHASONE 2 MG/1
2 TABLET ORAL 2 TIMES DAILY
Qty: 28 TABLET | Refills: 0 | Status: SHIPPED | OUTPATIENT
Start: 2017-01-01 | End: 2017-01-01

## 2017-01-01 RX ORDER — LISINOPRIL 20 MG/1
20 TABLET ORAL 2 TIMES DAILY
COMMUNITY
End: 2017-01-01

## 2017-01-01 RX ORDER — OMEGA-3S/DHA/EPA/FISH OIL/D3 300MG-1000
400 CAPSULE ORAL DAILY
Status: DISCONTINUED | OUTPATIENT
Start: 2017-01-01 | End: 2017-01-01 | Stop reason: HOSPADM

## 2017-01-01 RX ORDER — ONDANSETRON 2 MG/ML
4 INJECTION INTRAMUSCULAR; INTRAVENOUS EVERY 6 HOURS PRN
Status: DISCONTINUED | OUTPATIENT
Start: 2017-01-01 | End: 2017-01-01

## 2017-01-01 RX ORDER — SODIUM CHLORIDE 9 MG/ML
100 INJECTION, SOLUTION INTRAVENOUS CONTINUOUS
Status: DISCONTINUED | OUTPATIENT
Start: 2017-01-01 | End: 2017-01-01

## 2017-01-01 RX ORDER — CLONIDINE HYDROCHLORIDE 0.1 MG/1
0.1 TABLET ORAL ONCE
Status: COMPLETED | OUTPATIENT
Start: 2017-01-01 | End: 2017-01-01

## 2017-01-01 RX ORDER — PANTOPRAZOLE SODIUM 40 MG/1
40 INJECTION, POWDER, FOR SOLUTION INTRAVENOUS
Status: DISCONTINUED | OUTPATIENT
Start: 2017-01-01 | End: 2017-01-01

## 2017-01-01 RX ORDER — DEXAMETHASONE SODIUM PHOSPHATE 4 MG/ML
4 INJECTION, SOLUTION INTRA-ARTICULAR; INTRALESIONAL; INTRAMUSCULAR; INTRAVENOUS; SOFT TISSUE EVERY 8 HOURS
Status: COMPLETED | OUTPATIENT
Start: 2017-01-01 | End: 2017-01-01

## 2017-01-01 RX ADMIN — HEPARIN SODIUM 5000 UNITS: 5000 INJECTION, SOLUTION INTRAVENOUS; SUBCUTANEOUS at 01:24

## 2017-01-01 RX ADMIN — ZINC 1 TABLET: TAB ORAL at 08:59

## 2017-01-01 RX ADMIN — ZINC 1 TABLET: TAB ORAL at 09:09

## 2017-01-01 RX ADMIN — SODIUM CHLORIDE 1000 ML: 0.9 INJECTION, SOLUTION INTRAVENOUS at 03:30

## 2017-01-01 RX ADMIN — HEPARIN SODIUM 5000 UNITS: 5000 INJECTION, SOLUTION INTRAVENOUS; SUBCUTANEOUS at 06:29

## 2017-01-01 RX ADMIN — DEXAMETHASONE SODIUM PHOSPHATE: 4 INJECTION, SOLUTION INTRAMUSCULAR; INTRAVENOUS at 09:08

## 2017-01-01 RX ADMIN — BUTALBITAL, ACETAMINOPHEN, AND CAFFEINE 1 TABLET: 50; 325; 40 TABLET ORAL at 15:53

## 2017-01-01 RX ADMIN — MIRTAZAPINE 7.5 MG: 15 TABLET, FILM COATED ORAL at 21:50

## 2017-01-01 RX ADMIN — SODIUM CHLORIDE 1000 ML: 0.9 INJECTION, SOLUTION INTRAVENOUS at 01:06

## 2017-01-01 RX ADMIN — CHOLECALCIFEROL TAB 10 MCG (400 UNIT) 400 UNITS: 10 TAB at 08:59

## 2017-01-01 RX ADMIN — CEFAZOLIN 1000 MG: 1 INJECTION, POWDER, FOR SOLUTION INTRAVENOUS at 14:16

## 2017-01-01 RX ADMIN — FENTANYL CITRATE 25 MCG: 50 INJECTION INTRAMUSCULAR; INTRAVENOUS at 16:57

## 2017-01-01 RX ADMIN — ONDANSETRON 4 MG: 2 INJECTION INTRAMUSCULAR; INTRAVENOUS at 09:34

## 2017-01-01 RX ADMIN — LORAZEPAM 2 MG: 2 INJECTION INTRAMUSCULAR; INTRAVENOUS at 18:12

## 2017-01-01 RX ADMIN — VITAMIN E CAP 400 UNIT 400 UNITS: 400 CAP at 09:09

## 2017-01-01 RX ADMIN — GADOBUTROL 4 ML: 604.72 INJECTION INTRAVENOUS at 22:03

## 2017-01-01 RX ADMIN — SODIUM CHLORIDE 100 ML/HR: 0.9 INJECTION, SOLUTION INTRAVENOUS at 06:30

## 2017-01-01 RX ADMIN — GLYCOPYRROLATE 0.2 MG: 0.2 INJECTION, SOLUTION INTRAMUSCULAR; INTRAVENOUS at 17:56

## 2017-01-01 RX ADMIN — LABETALOL HYDROCHLORIDE 10 MG: 5 INJECTION, SOLUTION INTRAVENOUS at 13:02

## 2017-01-01 RX ADMIN — HYDRALAZINE HYDROCHLORIDE 5 MG: 20 INJECTION INTRAMUSCULAR; INTRAVENOUS at 09:58

## 2017-01-01 RX ADMIN — ZINC 1 TABLET: TAB ORAL at 08:17

## 2017-01-01 RX ADMIN — Medication 500 UNITS: at 06:08

## 2017-01-01 RX ADMIN — NEPHROCAP 1 CAPSULE: 1 CAP ORAL at 09:06

## 2017-01-01 RX ADMIN — ETOMIDATE 6 MG: 2 INJECTION INTRAVENOUS at 13:49

## 2017-01-01 RX ADMIN — METRONIDAZOLE 500 MG: 500 INJECTION, SOLUTION INTRAVENOUS at 19:30

## 2017-01-01 RX ADMIN — PROPOFOL 50 MCG/KG/MIN: 10 INJECTION, EMULSION INTRAVENOUS at 14:49

## 2017-01-01 RX ADMIN — MIDAZOLAM HYDROCHLORIDE 1 MG: 1 INJECTION, SOLUTION INTRAMUSCULAR; INTRAVENOUS at 15:11

## 2017-01-01 RX ADMIN — METOPROLOL TARTRATE 25 MG: 25 TABLET ORAL at 20:10

## 2017-01-01 RX ADMIN — GADOBUTROL 4 ML: 604.72 INJECTION INTRAVENOUS at 10:55

## 2017-01-01 RX ADMIN — PROPOFOL 30 MG: 10 INJECTION, EMULSION INTRAVENOUS at 15:00

## 2017-01-01 RX ADMIN — SODIUM BICARBONATE 50 MEQ: 84 INJECTION PARENTERAL at 13:54

## 2017-01-01 RX ADMIN — LORAZEPAM 2 MG: 2 INJECTION INTRAMUSCULAR; INTRAVENOUS at 17:55

## 2017-01-01 RX ADMIN — VITAMIN E CAP 400 UNIT 400 UNITS: 400 CAP at 15:53

## 2017-01-01 RX ADMIN — LEVETIRACETAM 500 MG: 500 TABLET, FILM COATED ORAL at 08:17

## 2017-01-01 RX ADMIN — CLONIDINE HYDROCHLORIDE 0.1 MG: 0.1 TABLET ORAL at 11:40

## 2017-01-01 RX ADMIN — ONDANSETRON 4 MG: 2 INJECTION INTRAMUSCULAR; INTRAVENOUS at 18:24

## 2017-01-01 RX ADMIN — SODIUM CHLORIDE 1000 ML: 0.9 INJECTION, SOLUTION INTRAVENOUS at 18:19

## 2017-01-01 RX ADMIN — HEPARIN 300 UNITS: 100 SYRINGE at 12:05

## 2017-01-01 RX ADMIN — METOPROLOL TARTRATE 50 MG: 50 TABLET ORAL at 21:50

## 2017-01-01 RX ADMIN — HEPARIN 300 UNITS: 100 SYRINGE at 15:52

## 2017-01-01 RX ADMIN — SUCCINYLCHOLINE CHLORIDE 80 MG: 20 INJECTION, SOLUTION INTRAMUSCULAR; INTRAVENOUS at 14:02

## 2017-01-01 RX ADMIN — VITAMIN D, TAB 1000IU (100/BT) 1000 UNITS: 25 TAB at 09:05

## 2017-01-01 RX ADMIN — CEFEPIME HYDROCHLORIDE 2000 MG: 2 INJECTION, SOLUTION INTRAVENOUS at 16:58

## 2017-01-01 RX ADMIN — Medication 400 UNITS: at 15:54

## 2017-01-01 RX ADMIN — Medication 400 UNITS: at 08:17

## 2017-01-01 RX ADMIN — IOHEXOL 100 ML: 350 INJECTION, SOLUTION INTRAVENOUS at 13:21

## 2017-01-01 RX ADMIN — HYDRALAZINE HYDROCHLORIDE 5 MG: 20 INJECTION INTRAMUSCULAR; INTRAVENOUS at 03:53

## 2017-01-01 RX ADMIN — LISINOPRIL 20 MG: 5 TABLET ORAL at 09:16

## 2017-01-01 RX ADMIN — SODIUM BICARBONATE 50 MEQ: 84 INJECTION PARENTERAL at 13:57

## 2017-01-01 RX ADMIN — Medication 400 UNITS: at 09:09

## 2017-01-01 RX ADMIN — SODIUM CHLORIDE, SODIUM LACTATE, POTASSIUM CHLORIDE, AND CALCIUM CHLORIDE 125 ML/HR: .6; .31; .03; .02 INJECTION, SOLUTION INTRAVENOUS at 14:37

## 2017-01-01 RX ADMIN — LORAZEPAM 1 MG: 2 INJECTION INTRAMUSCULAR; INTRAVENOUS at 13:05

## 2017-01-01 RX ADMIN — SODIUM CHLORIDE 1000 ML: 0.9 INJECTION, SOLUTION INTRAVENOUS at 08:26

## 2017-01-01 RX ADMIN — MENTHOL 5.4 MG: 5.4 LOZENGE ORAL at 20:14

## 2017-01-01 RX ADMIN — SODIUM CHLORIDE 500 ML: 0.9 INJECTION, SOLUTION INTRAVENOUS at 08:01

## 2017-01-01 RX ADMIN — ONDANSETRON HYDROCHLORIDE 4 MG: 2 INJECTION, SOLUTION INTRAVENOUS at 15:15

## 2017-01-01 RX ADMIN — METRONIDAZOLE 500 MG: 500 INJECTION, SOLUTION INTRAVENOUS at 09:27

## 2017-01-01 RX ADMIN — SODIUM CHLORIDE 20 ML/HR: 0.9 INJECTION, SOLUTION INTRAVENOUS at 12:57

## 2017-01-01 RX ADMIN — IODIXANOL 100 ML: 320 INJECTION, SOLUTION INTRAVASCULAR at 18:00

## 2017-01-01 RX ADMIN — VITAMIN E CAP 400 UNIT 400 UNITS: 400 CAP at 09:06

## 2017-01-01 RX ADMIN — FENTANYL CITRATE 50 MCG/HR: at 15:58

## 2017-01-01 RX ADMIN — DEXTROSE MONOHYDRATE 50 ML: 25 INJECTION, SOLUTION INTRAVENOUS at 13:52

## 2017-01-01 RX ADMIN — ZINC 1 TABLET: TAB ORAL at 15:53

## 2017-01-01 RX ADMIN — GADOBUTROL 10 ML: 604.72 INJECTION INTRAVENOUS at 10:06

## 2017-01-01 RX ADMIN — ACETAMINOPHEN 650 MG: 325 TABLET, FILM COATED ORAL at 15:18

## 2017-01-01 RX ADMIN — IOHEXOL 100 ML: 350 INJECTION, SOLUTION INTRAVENOUS at 09:00

## 2017-01-01 RX ADMIN — EPINEPHRINE 5 MCG/MIN: 1 INJECTION PARENTERAL at 13:39

## 2017-01-01 RX ADMIN — EPINEPHRINE 1 MCG/MIN: 1 INJECTION PARENTERAL at 15:29

## 2017-01-01 RX ADMIN — MIDAZOLAM HYDROCHLORIDE 1 MG: 1 INJECTION, SOLUTION INTRAMUSCULAR; INTRAVENOUS at 15:15

## 2017-01-01 RX ADMIN — ALBUMIN HUMAN: 0.05 INJECTION, SOLUTION INTRAVENOUS at 14:36

## 2017-01-01 RX ADMIN — LEVETIRACETAM 500 MG: 500 TABLET, FILM COATED ORAL at 21:50

## 2017-01-01 RX ADMIN — METOPROLOL TARTRATE 25 MG: 25 TABLET ORAL at 08:17

## 2017-01-01 RX ADMIN — METOPROLOL TARTRATE 50 MG: 50 TABLET ORAL at 09:09

## 2017-01-01 RX ADMIN — SODIUM CHLORIDE 2000 ML: 0.9 INJECTION, SOLUTION INTRAVENOUS at 16:06

## 2017-01-01 RX ADMIN — CEFAZOLIN SODIUM 1000 MG: 1 SOLUTION INTRAVENOUS at 14:46

## 2017-01-01 RX ADMIN — ONDANSETRON 4 MG: 2 INJECTION INTRAMUSCULAR; INTRAVENOUS at 16:06

## 2017-01-01 RX ADMIN — DEXAMETHASONE SODIUM PHOSPHATE: 10 INJECTION, SOLUTION INTRAMUSCULAR; INTRAVENOUS at 09:21

## 2017-01-01 RX ADMIN — LEVETIRACETAM 500 MG: 500 TABLET, FILM COATED ORAL at 20:20

## 2017-01-01 RX ADMIN — FENTANYL CITRATE 100 MCG: 50 INJECTION INTRAMUSCULAR; INTRAVENOUS at 17:56

## 2017-01-01 RX ADMIN — ALBUMIN HUMAN: 0.05 INJECTION, SOLUTION INTRAVENOUS at 14:30

## 2017-01-01 RX ADMIN — METOPROLOL TARTRATE 50 MG: 50 TABLET ORAL at 20:20

## 2017-01-01 RX ADMIN — SODIUM BICARBONATE 50 MEQ: 84 INJECTION PARENTERAL at 14:01

## 2017-01-01 RX ADMIN — DEXAMETHASONE 2 MG: 2 TABLET ORAL at 08:59

## 2017-01-01 RX ADMIN — DIPHENHYDRAMINE HYDROCHLORIDE 25 MG: 50 INJECTION, SOLUTION INTRAMUSCULAR; INTRAVENOUS at 10:05

## 2017-01-01 RX ADMIN — METOPROLOL TARTRATE 5 MG: 5 INJECTION, SOLUTION INTRAVENOUS at 09:15

## 2017-01-01 RX ADMIN — LEVETIRACETAM 500 MG: 500 TABLET, FILM COATED ORAL at 09:09

## 2017-01-01 RX ADMIN — DEXAMETHASONE SODIUM PHOSPHATE 4 MG: 4 INJECTION, SOLUTION INTRAMUSCULAR; INTRAVENOUS at 12:18

## 2017-01-01 RX ADMIN — METRONIDAZOLE 500 MG: 500 INJECTION, SOLUTION INTRAVENOUS at 01:19

## 2017-01-01 RX ADMIN — HYDRALAZINE HYDROCHLORIDE 5 MG: 20 INJECTION INTRAMUSCULAR; INTRAVENOUS at 23:54

## 2017-01-01 RX ADMIN — METOPROLOL TARTRATE 50 MG: 50 TABLET ORAL at 08:59

## 2017-01-01 RX ADMIN — DEXAMETHASONE SODIUM PHOSPHATE: 10 INJECTION, SOLUTION INTRAMUSCULAR; INTRAVENOUS at 13:47

## 2017-01-01 RX ADMIN — LEVETIRACETAM 500 MG: 500 TABLET, FILM COATED ORAL at 08:59

## 2017-01-01 RX ADMIN — DEXAMETHASONE SODIUM PHOSPHATE 2 MG: 4 INJECTION, SOLUTION INTRAMUSCULAR; INTRAVENOUS at 16:58

## 2017-01-01 RX ADMIN — LISINOPRIL 20 MG: 20 TABLET ORAL at 08:59

## 2017-01-01 RX ADMIN — LORAZEPAM 0.5 MG: 0.5 TABLET ORAL at 21:30

## 2017-01-01 RX ADMIN — SODIUM CHLORIDE 500 MG: 0.9 INJECTION, SOLUTION INTRAVENOUS at 10:43

## 2017-01-01 RX ADMIN — ATROPINE SULFATE 0.5 MG: 0.4 INJECTION INTRAMUSCULAR; INTRAVENOUS; SUBCUTANEOUS at 14:05

## 2017-01-01 RX ADMIN — LISINOPRIL 20 MG: 20 TABLET ORAL at 08:17

## 2017-01-01 RX ADMIN — SODIUM CHLORIDE 500 MG: 0.9 INJECTION, SOLUTION INTRAVENOUS at 14:28

## 2017-01-01 RX ADMIN — SODIUM CHLORIDE 20 ML/HR: 0.9 INJECTION, SOLUTION INTRAVENOUS at 08:40

## 2017-01-01 RX ADMIN — LISINOPRIL 20 MG: 20 TABLET ORAL at 09:09

## 2017-01-01 RX ADMIN — FAMOTIDINE 20 MG: 10 INJECTION, SOLUTION INTRAVENOUS at 16:07

## 2017-01-01 RX ADMIN — LEVETIRACETAM 500 MG: 500 TABLET, FILM COATED ORAL at 20:10

## 2017-01-01 RX ADMIN — DEXAMETHASONE SODIUM PHOSPHATE 4 MG: 4 INJECTION, SOLUTION INTRAMUSCULAR; INTRAVENOUS at 18:46

## 2017-01-12 NOTE — PROGRESS NOTES
1000 Pt states she has a "tickle causing a cough"  since recent surgery and it is no worse  Nathalie Severe, RN made aware of same and instructed to inform pt to contact her cardiologist  Pt verbalized understanding of same

## 2017-02-01 NOTE — PROGRESS NOTES
2337 Call placed to iNsh Fischer RN to report blood pressure  Waiting for new order from Dr Carlota Patel

## 2017-02-01 NOTE — PROGRESS NOTES
2700 Jam Salas to Gaetano Rooney RN regarding elevated blood pressure and left shin mild pitting edema  Pt denies lightheadedness, dizziness, SOB, fatigue, or any complaints   Waiting for orders from Dr Ovidio Baird

## 2017-02-01 NOTE — PROGRESS NOTES
1405 Pt very sleepy and states she feels like she is "moving very slow"   Call placed to Candance Ghee, RN regarding same and Dr Jen Suarez will send order to hold IV benadryl

## 2017-02-01 NOTE — PROGRESS NOTES
[address] given written paper with today's blood pressure readings  Parker instructed to contact Dr Royce Lawson and Dr Autumn Laureano regarding same per Dr Snehal Siegel   Pt and Parker verbalized understanding of same

## 2017-02-01 NOTE — PROGRESS NOTES
[address] much less pink since this morning  Pt instructed by Callie Abarca, RN to apply antibiotic ointment to site to prevent infection starting tomorrow and to also use dressing instead of bandaid to protect site from her bra  Instructed to report any pain, tenderness, or change in skin color to Dr Eloy Pereira and  verbalized understanding of same

## 2017-02-01 NOTE — PROGRESS NOTES
1600 Pt states she is much more awake and ready to go home  Pt taken to car via wheelchair by myself and her    instructed to remain with Gardenia Robles for next 24 hours and he verbalized understanding  of same   Pt tolerated chemotherapy well and discharged in stable condition

## 2017-02-16 PROBLEM — I60.9 SAH (SUBARACHNOID HEMORRHAGE) (HCC): Status: ACTIVE | Noted: 2017-01-01

## 2017-02-16 PROBLEM — R58 BLEEDING: Status: ACTIVE | Noted: 2017-01-01

## 2017-02-17 PROBLEM — R58 BLEEDING: Status: RESOLVED | Noted: 2017-01-01 | Resolved: 2017-01-01

## 2017-08-05 PROBLEM — I60.9 SAH (SUBARACHNOID HEMORRHAGE) (HCC): Chronic | Status: ACTIVE | Noted: 2017-01-01

## 2017-08-05 PROBLEM — K72.00 SHOCK LIVER: Status: ACTIVE | Noted: 2017-01-01

## 2017-08-05 PROBLEM — R79.89 ELEVATED LACTIC ACID LEVEL: Status: ACTIVE | Noted: 2017-01-01

## 2017-08-05 PROBLEM — R57.9 SHOCK CIRCULATORY (HCC): Status: ACTIVE | Noted: 2017-01-01

## 2017-08-05 PROBLEM — N17.9 AKI (ACUTE KIDNEY INJURY) (HCC): Status: ACTIVE | Noted: 2017-01-01

## 2017-08-05 PROBLEM — R53.1 WEAKNESS: Status: ACTIVE | Noted: 2017-01-01

## 2017-08-05 PROBLEM — J96.01 ACUTE RESPIRATORY FAILURE WITH HYPOXIA (HCC): Status: ACTIVE | Noted: 2017-01-01

## 2017-08-05 PROBLEM — R53.1 WEAKNESS: Status: RESOLVED | Noted: 2017-01-01 | Resolved: 2017-01-01

## 2017-08-05 PROBLEM — J96.02 ACUTE HYPERCAPNIC RESPIRATORY FAILURE (HCC): Status: ACTIVE | Noted: 2017-01-01

## 2017-08-05 PROBLEM — R19.7 DIARRHEA: Status: ACTIVE | Noted: 2017-01-01

## 2017-08-06 LAB
ATRIAL RATE: 101 BPM
P AXIS: 59 DEGREES
PR INTERVAL: 146 MS
QRS AXIS: 79 DEGREES
QRSD INTERVAL: 83 MS
QT INTERVAL: 333 MS
QTC INTERVAL: 432 MS
T WAVE AXIS: 65 DEGREES
VENTRICULAR RATE: 101 BPM

## 2017-08-07 LAB
ATRIAL RATE: 89 BPM
P AXIS: 50 DEGREES
PR INTERVAL: 138 MS
QRS AXIS: 56 DEGREES
QRSD INTERVAL: 72 MS
QT INTERVAL: 378 MS
QTC INTERVAL: 459 MS
T WAVE AXIS: 78 DEGREES
VENTRICULAR RATE: 89 BPM

## 2017-08-09 LAB
ABO GROUP BLD BPU: NORMAL
BPU ID: NORMAL
UNIT DISPENSE STATUS: NORMAL
UNIT PRODUCT CODE: NORMAL
UNIT RH: NORMAL

## 2018-01-09 NOTE — PROGRESS NOTES
Assessment    1  Adenocarcinoma of right lung (162 9) (C34 91)   2  Liver metastases (197 7) (C78 7)    Plan  Liver metastases    · Drink plenty of fluids ; Status:Complete;   Done: 39CAU0523   Ordered; For:Liver metastases; Ordered By:Katie Reis;   · Follow-up visit in 3 weeks Evaluation and Treatment  Follow-up  Status: Hold For -  Scheduling  Requested for: 35IQS3347   Ordered; For: Liver metastases; Ordered By: Nicole Pat Performed:  Due: 80WLL5384   · *1- [facility] INTERVENTIONAL RADIOLOGY Physician Referral  Consult  Port a cath placement  Status: Hold For - Scheduling  Requested for: 24YGR4193   Ordered; For: Liver metastases; Ordered By: Nicole Pat Performed:  Due: 63RZK3786  Care Summary provided  : Yes    Discussion/Summary  Discussion Summary:   In summary, this is a 53-year-old female with history of adenocarcinoma the lung as outlined  She has had 3 cycles of Taxotere and Cyramza  She seems to be tolerating this fairly well  She has alopecia, as expected  CBC remains normal, however  No hypertension is noted  Her performance status remains excellent  Interestingly, and surprisingly, CAT scan shows marked improvement in her right pulmonary masses and less noticeable improvement in hepatic lesions due to lack of IV contrast  Previously noted pleural lesions have likewise improved  A new 3 mm peripheral nodule is noted in the posterior left lower lobe  This is of questionable significance  No other new disease is noted  I reviewed the above findings and their significance with the patient and her   It is difficult to understand why her disease is now responding, obviously considering that she is on a different chemotherapy program that she had been on previously  That is to say generally cytotoxic chemotherapy in the second line setting is associated with a low likelihood of further response  This is more noteworthy because her disease was initially essentially refractory to cytotoxic's  Immunotherapies were likewise ineffective  This leaves Cyramza as a more major contributors to her current response  Over the years, there have been attempts to identify biomarkers that might predict for response to VEGF inhibitors, but no reliable indicator has been identified  Again, I reviewed the above with the patient and her   We made arrangements to continue her chemotherapy moving forward  Taxotere nurse can be continued on a long-term basis, although fatigue and cytopenias are generally dose-limiting in the cumulative fashion  Cyramza, on the other hand, tends to be better tolerated in the long-term setting with hypertension and proteinuria as potential toxicities over time  Her venous access is quite poor  She recently had a episode of left hand thrombophlebitis  Port-A-Cath is requested, therefore  I'll see her back in 3 weeks for review  Repeat imaging in 2 months  The patient and her  voiced understanding and agreement  Medication SE Review and Pt Understands Tx: Possible side effects of new medications were reviewed with the patient/guardian today  The treatment plan was reviewed with the patient/guardian  The patient/guardian understands and agrees with the treatment plan   Understands and agrees with treatment plan: The treatment plan was reviewed with the patient/guardian  The patient/guardian understands and agrees with the treatment plan   Counseling Documentation With Imm: The patient, patient's family was counseled regarding diagnostic results, instructions for management, prognosis, patient and family education, impressions, risks and benefits of treatment options  total time of encounter was 40 minutes  Chief Complaint  Chief Complaint Free Text Note Form: Follow-up regarding lung cancer  History of Present Illness  HPI: September 2015- started with a cough  Had small volume hemoptysis  Saw PCP   Got CXR which showed right middle lobe airspace disease, small, right pleural effusion  CT scan of the chest showed 4 1 cm right hilar mass with obstruction of the right middle lobe bronchus  Tumor extends along, right hilum encasing the right middle lobe pulmonary artery  Numerous pleural-based nodules are noted on the right  Borderline possibly necrotic adenopathy is noted in the internal mammary chain  Thickening of the left adrenal is noted  December 15, 2015- Bronchoscopy proved positive for adenocarcinoma, TTF-1 positive  January 2016 rash  Started Alimta 500 mg per meter squared, carbo AUC-5, both given, q  21 days  March 2016- CT showed mixed response  Right chest Pleurx catheter placed  Chemotherapy continued on schedule  May 20, 2016- CT showed progressive disease  Alimta and carboplatin discontinued  Opdivo 3 mg/kg IV every 2 weeks initiated  Current Therapy: May 20, 2016- CT showed progressive disease  Alimta and carboplatin discontinued  Opdivo 3 mg/kg IV every 2 weeks initiated  Interval History: Asept cath drainage has been diminishing, now about 6cc/wk  Review of Systems  Complete-Female:   Constitutional: No fever, no chills, feels well, no tiredness, no recent weight gain or weight loss and no recent weight loss  Eyes: No complaints of eye pain, no red eyes, no eyesight problems, no discharge, no dry eyes, no itching of eyes  ENT: no complaints of earache, no loss of hearing, no nose bleeds, no nasal discharge, no sore throat, no hoarseness  Cardiovascular: No complaints of slow heart rate, no fast heart rate, no chest pain, no palpitations, no leg claudication, no lower extremity edema  Respiratory: No complaints of shortness of breath, no wheezing, no cough, no SOB on exertion, no orthopnea, no PND  Gastrointestinal: 2013- colonoscopy-no polyps  , but No complaints of abdominal pain, no constipation, no nausea or vomiting, no diarrhea, no bloody stools     Genitourinary: No complaints of dysuria, no incontinence, no pelvic pain, no dysmenorrhea, no vaginal discharge or bleeding  Musculoskeletal: No complaints of arthralgias, no myalgias, no joint swelling or stiffness, no limb pain or swelling  Integumentary: No complaints of skin rash or lesions, no itching, no skin wounds, no breast pain or lump  Neurological: No complaints of headache, no confusion, no convulsions, no numbness, no dizziness or fainting, no tingling, no limb weakness, no difficulty walking  Psychiatric: Not suicidal, no sleep disturbance, no anxiety or depression, no change in personality, no emotional problems  Endocrine: No complaints of proptosis, no hot flashes, no muscle weakness, no deepening of the voice, no feelings of weakness  Hematologic/Lymphatic: No complaints of swollen glands, no swollen glands in the neck, does not bleed easily, does not bruise easily  Active Problems    1  Adenocarcinoma of right lung (162 9) (C34 91)   2  Anxiety (300 00) (F41 9)   3  Colon cancer screening (V76 51) (Z12 11)   4  Cough (786 2) (R05)   5  Encounter for routine gynecological examination (V72 31) (Z01 419)   6  Encounter for screening mammogram for malignant neoplasm of breast (V76 12)   (Z12 31)   7  Hyperlipidemia (272 4) (E78 5)   8  Lung mass (786 6) (R91 8)   9  Pleural effusion on right (511 9) (J90)   10  Pneumonia (486) (J18 9)   11  Post-menopausal osteoporosis (733 01) (M81 0)   12  Red eye (379 93) (H57 8)    Past Medical History    1  History of Encounter for screening mammogram for malignant neoplasm of breast   (V76 12) (Z12 31)   2  History of Pneumonia (486) (J18 9)   3  History of Sinusitis (473 9) (J32 9)    Surgical History    1  History of Salpingo-oophorectomy Bilateral   2  History of Total Abdominal Hysterectomy    Family History  Mother    1  Family history of Diabetes (250 00) (E11 9)   2  Family history of Hypertension (401 9) (I10)  Father    3  Family history of Asthma (493 90) (J45 909)  Brother    4   Family history of malignant neoplasm of stomach (V16 0) (Z80 0)  Family History    5  Denied: Family history of colon cancer   6  Denied: Family history of malignant neoplasm of breast   7  Denied: Family history of malignant neoplasm of uterus   8  Denied: Family history of osteoporosis   9  Denied: Family history of Ovarian ca    Social History    · Caffeine Use   · Denied: Drug use (305 90) (F19 90)   · Former smoker (P21 41) (Z87 891)   · No alcohol use   · Sun Protection Sunscreen   · Uses Safety Equipment - Seatbelts    Current Meds   1  B Complex CAPS; Therapy: (Recorded:46Eyd7076) to Recorded   2  Calcium + D TABS; Take as directed per package instructions Recorded   3  Lisinopril 2 5 MG Oral Tablet; TAKE 1 TABLET DAILY; Therapy: 56Eeo6505 to ([facility])  Requested for: 20Avc8458; Last   Rx:37Ben3866 Ordered   4  LORazepam 0 5 MG Oral Tablet; TAKE 1 TABLET Every 8 hours; Therapy: 22DIP7155 to (Evaluate:05Jun2016); Last Rx:43Btc3132 Ordered   5  Vitamin D 2000 UNIT Oral Tablet Recorded   6  Vitamin E TABS; Therapy: (Recorded:44Dhx7445) to Recorded    Allergies    1  Tetracyclines    2  Shellfish    Physical Exam    Constitutional   General appearance: No acute distress, well appearing and well nourished  Eyes   Conjunctiva and lids: No swelling, erythema or discharge  Ears, Nose, Mouth, and Throat   External inspection of ears and nose: Normal     Oropharynx: Normal with no erythema, edema, exudate or lesions  Pulmonary   Auscultation of lungs: Clear to auscultation  Cardiovascular   Auscultation of heart: Normal rate and rhythm, normal S1 and S2, without murmurs  Examination of extremities for edema and/or varicosities: Normal     Abdomen   Abdomen: Non-tender, no masses  Liver and spleen: No hepatomegaly or splenomegaly  Lymphatic   Palpation of lymph nodes in neck: No lymphadenopathy      Musculoskeletal   Gait and station: Normal     Skin   Skin and subcutaneous tissue: Normal without rashes or lesions  Neurologic   Cranial nerves: Cranial nerves 2-12 intact  Psychiatric   Orientation to person, place, and time: Normal          Results/Data  * CT CHEST ABDOMEN PELVIS WO CONTRAST [id number] 10:44AM Gertrude Arnold Order Number: [id number]   Performing Comments: AT [address] CAT AS DIRECTED   - Patient Instructions: To schedule this appointment, please contact Central Scheduling at [phone]  Test Name Result Flag Reference   CT CHEST ABDOMEN PELVIS WO CONTRAST (Report)     CT CHEST, ABDOMEN AND PELVIS WITHOUT IV CONTRAST     INDICATION: Metastatic adenocarcinoma  Reevaluation post chemotherapy  COMPARISON: CT chest dated 7/11/2016  CT dated 5/18/2016  TECHNIQUE: CT examination of the chest, abdomen and pelvis was performed  This examination, like all CT scans performed in the [facility], was performed utilizing techniques to minimize radiation dose exposure, including the use of    iterative reconstruction and automated exposure control  Axial, sagittal, and coronal reformatted images were submitted for interpretation  Intravenous contrast was not administered as part of this examination  Enteric contrast was administered  Intravenous contrast was not administered due to lack of IV access  CHEST     LUNGS: Previously noted nodule in the right upper lobe has significantly decreased in size, previously measuring 1 cm now measures 3 mm in size  Residual adjacent scarring is unchanged  Signs of reticulation with centrilobular nodules and pleural thickening remain just laterally to this area of pleural thickening  There remains multiple pleural-based nodules along the right major fissure which are actually decreased in size  The    medial right upper lobe nodule has also decreased in size now measuring 4 mm as opposed to 7 mm (series 3 image 21 on today's examination versus series 3 image 29 on prior examination)       Again noted is the right hilar mass with right middle and inferior bronchial wall involvement  However, the hilar component appears decreased  The extensive pleural and consolidative portions involving the inferior right middle lobe as well as the    inferior right hepatic lobe has also decreased when compared to the prior examination  There is a 3 mm pleural-based nodule seen in the right lower lobe (series 3 image 39)  While this could be reactive, attention on follow-up is recommended  No additional parenchymal changes on the right  PLEURA: The remains a right pleural effusion     HEART/GREAT VESSELS: Heart is normal in size  Trace pericardial fluid is noted  MEDIASTINUM AND TRACIE: New right hilar mass is noted  No left-sided hilar adenopathy or mediastinal adenopathy  CHEST WALL AND LOWER NECK: Mild anasarca  ABDOMEN     LIVER/BILIARY TREE: Again noted are multiple hypodensities within the liver consistent with metastatic disease  Some of these appear less apparent when compared to the prior examination but this is likely secondary to contrast enhancement  The largest   remains within the inferior right hepatic lobe measuring 1 5 cm  2nd largest in the left hepatic lobe adjacent to the gallbladder measures 6 mm, slightly decreased in size within limitations  GALLBLADDER: No calcified gallstones  No pericholecystic inflammatory change  SPLEEN: Unremarkable  PANCREAS: Unremarkable  ADRENAL GLANDS: Unchanged is the left-sided adrenal thickening  No discrete mass or interval change  KIDNEYS/URETERS: No evidence for hydronephrosis  No discrete renal calculus  STOMACH AND BOWEL: The proximal alimentary tract is normal in configuration  No evidence for small bowel obstruction  No evidence for colitis or diverticulitis  There are scattered diverticula  APPENDIX: No findings to suggest appendicitis  ABDOMINOPELVIC CAVITY: No ascites or free intraperitoneal air   No pathologic lymphadenopathy  VESSELS: Aortoiliac calcifications noted  PELVIS     REPRODUCTIVE ORGANS: Patient is status post hysterectomy  URINARY BLADDER: Unremarkable  ABDOMINAL WALL/INGUINAL REGIONS: Unremarkable  OSSEOUS STRUCTURES: No acute fracture or destructive osseous lesion  IMPRESSION:     Significant interval decrease of multiple parenchymal and pleural-based metastases involving the right middle and lower lobes when compared to 7/11/2016  New 3 mm pleural-based nodule within the posterior left lower lobe  Could be reactive but attention on follow-up  Redemonstration of hypodensities within the liver, not as well characterized given the lack of IV contrast, but remain consistent with metastatic disease  No signs of additional metastases  Workstation performed: [id number]     Signed by:   Betty Lobato MD   9/29/16     (1) CBC/PLT/DIFF [id number] 08:41AM Smitha Lubna Order Number: [id number]     Test Name Result Flag Reference   WBC COUNT 14 93 Thousand/uL H 4 31-10 16   RBC COUNT 4 59 Million/uL  3 81-5 12   HEMOGLOBIN 13 5 g/dL  11 5-15 4   HEMATOCRIT 43 1 %  34 8-46  1   MCV 94 fL  82-98   MCH 29 4 pg  26 8-34 3   MCHC 31 3 g/dL L 31 4-37 4   RDW 16 1 % H 11 6-15 1   MPV 9 7 fL  8 9-12 7   PLATELET COUNT 068 Thousands/uL  149-390   - Patient Instructions: This bloodwork is non-fasting  Please drink two glasses of water morning of bloodwork  - Patient Instructions: This bloodwork is non-fasting  Please drink two glasses of water morning of bloodwork  This is an appended report  These results have been appended to a previously verified report     NEUTROPHILS - REL 85 % H 43-75   BANDS - REL 4 %  0-8   LYMPHOCYTES - REL 7 % L 14-44   MONOCYTES - REL 4 %  4-12   EOSINOPHILS - REL 0 %  0-6   BASOPHILS - REL 0 %  0-1   NEUTROPHILS ABS 13 29 Thousand/uL H 1 85-7 62   LYMPHOTCYTES ABS 1 05 Thousand/uL  0 60-4 47   MONOCYTES ABS 0 60 Thousand/uL 0  00-1 22   EOSINOPHILS ABS 0 00 Thousand/uL  0 00-0 40   BASOPHILS ABS 0 00 Thousand/uL  0 00-0 10   TOTAL COUNTED 100     PLT ESTIMATE Adequate  Adequate   - Patient Instructions: This bloodwork is non-fasting  Please drink two glasses of water morning of bloodwork  - Patient Instructions: This bloodwork is non-fasting  Please drink two glasses of water morning of bloodwork  (1) COMPREHENSIVE METABOLIC PANEL [id number] 54:94LE Trip Munroe Order Number: [id number]     Test Name Result Flag Reference   GLUCOSE,RANDM 83 mg/dL     If the patient is fasting, the ADA then defines impaired fasting glucose as > 100 mg/dL and diabetes as > or equal to 123 mg/dL  SODIUM 141 mmol/L  136-145   POTASSIUM 3 7 mmol/L  3 5-5 3   CHLORIDE 103 mmol/L  100-108   CARBON DIOXIDE 31 mmol/L  21-32   ANION GAP (CALC) 7 mmol/L  4-13   BLOOD UREA NITROGEN 10 mg/dL  5-25   CREATININE 0 55 mg/dL L 0 60-1 30   Standardized to IDMS reference method   CALCIUM 8 4 mg/dL  8 3-10 1   BILI, TOTAL 0 20 mg/dL  0 20-1 00   ALK PHOSPHATAS 148 U/L H    ALT (SGPT) 36 U/L  12-78   AST(SGOT) 27 U/L  5-45   ALBUMIN 3 2 g/dL L 3 5-5 0   TOTAL PROTEIN 6 8 g/dL  6 4-8 2   eGFR Non-African American      >60 0 ml/min/1 73sq m   [facility] [employer] Disease Education Program recommendations are as follows:  GFR calculation is accurate only with a steady state creatinine  Chronic Kidney disease less than 60 ml/min/1 73 sq  meters  Kidney failure less than 15 ml/min/1 73 sq  meters  Health Management  Health Maintenance   Medicare Annual Wellness Visit; every 1 year; Last 01QAY5203; Next Due: [id number];  Active    Signatures   Electronically signed by : TRINITY Laguna DOM D ,DO; Sep 30 2016  9:23AM EST                       (Author)

## 2018-01-11 NOTE — PROGRESS NOTES
Assessment    1  Adenocarcinoma of right lung (162 9) (C34 91)    Plan  Adenocarcinoma of right lung    · Drink plenty of fluids ; Status:Complete;   Done: 19LGW8759   Ordered; For:Adenocarcinoma of right lung; Ordered By:Buffy Reis;   · Follow-up visit in 3 weeks Evaluation and Treatment  Follow-up  Status: Hold For -  Scheduling  Requested for: 90FBC6842   Ordered; For: Adenocarcinoma of right lung; Ordered By: Onesimo Escobar Performed:  Due: 75JDP3075    Discussion/Summary  Discussion Summary:   In summary, this is a 78year old female with a history of lung cancer as outlined  She has had first cycle of Alimta/carbo  She tolerated this without any difficulty  Blood counts at an acceptable  Will proceed with her second cycle of chemotherapy  Restaging will occur after her third  We reviewed that they're essentially  No restrictions on her activity level  Molecular markers were negative, arguing against potential utility of targeted agents available  I reviewed the above considerations  The patient and her daughter  I asked her to extend her blood work interval to pretreatment  The only  The patient and her daughter voiced understanding above, discussion we'll be proceeding as outlined  Medication SE Review and Pt Understands Tx: Possible side effects of new medications were reviewed with the patient/guardian today  The treatment plan was reviewed with the patient/guardian  The patient/guardian understands and agrees with the treatment plan   Understands and agrees with treatment plan: The treatment plan was reviewed with the patient/guardian  The patient/guardian understands and agrees with the treatment plan   Counseling Documentation With Imm: The patient was counseled regarding diagnostic results, instructions for management, patient and family education, impressions  total time of encounter was 25 minutes  History of Present Illness  HPI: September 2015- started with a cough   Thought it was allergies  Had small volume hemoptysis  Saw PCP  Got CXR which showed right middle lobe airspace disease, small, right pleural effusion  CT scan of the chest showed 4 1 cm right hilar mass with obstruction of the right middle lobe bronchus  Tumor extends along, right hilum encasing the right middle lobe pulmonary artery  Numerous pleural-based nodules are noted on the right  Borderline possibly necrotic adenopathy is noted in the internal mammary chain  Thickening of the left adrenal is noted  December 15, 2015- Bronchoscopy proved positive for adenocarcinoma, TTF-1 positive  Review of Systems  Complete-Female:   Constitutional: recent weight loss, but No fever, no chills, feels well, no tiredness, no recent weight gain or weight loss  Eyes: No complaints of eye pain, no red eyes, no eyesight problems, no discharge, no dry eyes, no itching of eyes  ENT: no complaints of earache, no loss of hearing, no nose bleeds, no nasal discharge, no sore throat, no hoarseness  Cardiovascular: No complaints of slow heart rate, no fast heart rate, no chest pain, no palpitations, no leg claudication, no lower extremity edema  Respiratory: No complaints of shortness of breath, no wheezing, no cough, no SOB on exertion, no orthopnea, no PND  Gastrointestinal: 2013- colonoscopy-no polyps  , but No complaints of abdominal pain, no constipation, no nausea or vomiting, no diarrhea, no bloody stools  Genitourinary: No complaints of dysuria, no incontinence, no pelvic pain, no dysmenorrhea, no vaginal discharge or bleeding  Musculoskeletal: No complaints of arthralgias, no myalgias, no joint swelling or stiffness, no limb pain or swelling  Integumentary: No complaints of skin rash or lesions, no itching, no skin wounds, no breast pain or lump  Neurological: No complaints of headache, no confusion, no convulsions, no numbness, no dizziness or fainting, no tingling, no limb weakness, no difficulty walking  Psychiatric: Not suicidal, no sleep disturbance, no anxiety or depression, no change in personality, no emotional problems  Endocrine: No complaints of proptosis, no hot flashes, no muscle weakness, no deepening of the voice, no feelings of weakness  Hematologic/Lymphatic: No complaints of swollen glands, no swollen glands in the neck, does not bleed easily, does not bruise easily  Active Problems    1  Adenocarcinoma of right lung (162 9) (C34 91)   2  Colon cancer screening (V76 51) (Z12 11)   3  Cough (786 2) (R05)   4  Hyperlipidemia (272 4) (E78 5)   5  Lung mass (786 6) (R91 8)   6  Pneumonia (486) (J18 9)   7  Post-menopausal osteoporosis (733 01) (M81 0)   8  Red eye (379 93) (H57 8)    Past Medical History    1  History of Encounter for routine gynecological examination (V72 31) (Z01 419)   2  History of Encounter for screening mammogram for malignant neoplasm of breast   (V76 12) (Z12 31)   3  History of Sinusitis (473 9) (J32 9)    Surgical History    1  History of Salpingo-oophorectomy Bilateral   2  History of Total Abdominal Hysterectomy    Family History    1  Family history of Diabetes (250 00) (E11 9)   2  Family history of Hypertension (401 9) (I10)    3  Family history of Asthma (493 90) (J45 909)    4  Family history of malignant neoplasm of stomach (V16 0) (Z80 0)    5  Denied: Family history of colon cancer   6  Denied: Family history of malignant neoplasm of breast   7  Denied: Family history of malignant neoplasm of uterus   8  Denied: Family history of osteoporosis   9  Denied: Family history of Ovarian ca    Social History    · Caffeine Use   · Former smoker (C10 51) (W49 460)   · Marielos Candelario Protection Sunscreen   · Uses Safety Equipment - Seatbelts  Social History Reviewed: The social history was reviewed and updated today  Current Meds   1  B Complex CAPS; Therapy: (Recorded:68Ohr8100) to Recorded   2  Calcium + D TABS; Take as directed per package instructions Recorded   3  Dexamethasone 4 MG Oral Tablet; TAKE 1 TABLET TWICE DAILY  START DAY BEFORE   CHEMO,DAY OF CHEMO, AND DAY AFTER CHEMO-3 DAYS TOTAL; Therapy: 05AIG3012 to (Evaluate:12Jan2016)  Requested for: 51Qpd2392; Last   Rx:98Rgm9477 Ordered   4  Flax Seed Oil CAPS; Therapy: (Recorded:14Nov2012) to Recorded   5  Folic Acid 1 MG Oral Tablet; TAKE 1 TABLET DAILY AS DIRECTED; Therapy: 15CCL0916 to (Evaluate:27Vfu9568)  Requested for: 54RXU7311; Last   Rx:80Iku3455 Ordered   6  Prochlorperazine Maleate 10 MG Oral Tablet; TAKE 1 TABLET EVERY 6 HOURS AS   NEEDED FOR NAUSEA; Therapy: 85PED1406 to (Evaluate:45Jkv1370)  Requested for: 06Jfu4017; Last   Rx:03Sdn1885 Ordered   7  Vitamin D 2000 UNIT Oral Tablet Recorded    Allergies    1  Tetracyclines    2  Shellfish    Vitals  Vital Signs [Data Includes: Current Encounter]    Recorded: 35RNA1016 12:21PM   Temperature 97 2 F   Heart Rate 90   Respiration 16   Systolic 824   Diastolic 74   Height 5 ft 2 in   Weight 113 lb 6 08 oz   BMI Calculated 20 74   BSA Calculated 1 5   O2 Saturation 91   Pain Scale 0     Physical Exam    Constitutional   General appearance: No acute distress, well appearing and well nourished  Eyes   Conjunctiva and lids: No swelling, erythema or discharge  Ears, Nose, Mouth, and Throat   External inspection of ears and nose: Normal     Oropharynx: Normal with no erythema, edema, exudate or lesions  Pulmonary   Auscultation of lungs: Clear to auscultation  Cardiovascular   Auscultation of heart: Normal rate and rhythm, normal S1 and S2, without murmurs  Examination of extremities for edema and/or varicosities: Normal     Abdomen   Abdomen: Non-tender, no masses  Liver and spleen: No hepatomegaly or splenomegaly  Lymphatic   Palpation of lymph nodes in neck: No lymphadenopathy  Musculoskeletal   Gait and station: Normal     Skin   Skin and subcutaneous tissue: Normal without rashes or lesions      Neurologic   Cranial nerves: Cranial nerves 2-12 intact  Psychiatric   Orientation to person, place, and time: Normal          Health Management  Health Maintenance   Medicare Annual Wellness Visit; every 1 year; Last 19QVM9504; Next Due: [id number];  Active    Signatures   Electronically signed by : TRINITY Aleman DO; Jan 26 2016  1:29PM EST                       (Author)

## 2018-01-11 NOTE — PROGRESS NOTES
Assessment    1  Adenocarcinoma of right lung (162 9) (C34 91)    Plan  Adenocarcinoma of right lung    · Drink plenty of fluids ; Status:Complete;   Done: 31USQ7192   Ordered; For:Adenocarcinoma of right lung; Ordered By:Catrina Reis;   · Follow-up visit in 1 month Evaluation and Treatment  Follow-up  Status: Complete  Done:  30IXO7958 01:15PM   Ordered; For: Adenocarcinoma of right lung; Ordered By: Pa Warner Performed:  Due: 39CXY9550; Last Updated By: Lorena Birch; 5/20/2016 12:44:01 PM    Discussion/Summary  Discussion Summary:   In summary, this is a 82-year-old female history of adenocarcinoma of the lung  As outlined  Recent CT shows an increase in the size of her right lower lobe mass to current 5 3 cm  Right hilar mass has likewise increased from previous 2 2 cm to current 3 5 cm  Pleural-based nodules are essentially unchanged  Small right pleural effusion is noted with right-sided catheter in place  Subcarinal node is stable at 1 2 cm  No new disease is noted  Given the above considerations, one could say that her primary tumor with mediastinal extension is increasing  While the remainder of her disease seems to be stable and minimal   While radiation therapy would generally not be considered  In patients with advanced adenocarcinoma except in cases of palliative circumstance  It may be reasonable in her case  Due to " local progression " As outlined  We also reviewed potential utility of Opdivo versus the combination of Taxotere and Cyramza  My inclination would be to incorporate immunotherapies sooner than cytotoxic chemotherapy since response rates, stable disease  Rates, and duration of response, are all better with immunotherapy than with cytotoxic therapy  In general  This medication is fairly well tolerated with autoimmune toxicities, infrequently, severe, and reversible    A be reviewing her case at the lung cancer working group on Monday and call her back thereafter with the consensus recommendation  The patient and her daughter voiced understanding and agreement with the above, and we'll be proceeding as outlined  Understands and agrees with treatment plan: The treatment plan was reviewed with the patient/guardian  The patient/guardian understands and agrees with the treatment plan   Counseling Documentation With Imm: The patient, patient's family was counseled regarding diagnostic results, instructions for management, patient and family education, impressions  total time of encounter was 40 minutes  Chief Complaint  Chief Complaint Free Text Note Form: Followup regarding lung cancer  History of Present Illness  HPI: September 2015- started with a cough  Had small volume hemoptysis  Saw PCP  Got CXR which showed right middle lobe airspace disease, small, right pleural effusion  CT scan of the chest showed 4 1 cm right hilar mass with obstruction of the right middle lobe bronchus  Tumor extends along, right hilum encasing the right middle lobe pulmonary artery  Numerous pleural-based nodules are noted on the right  Borderline possibly necrotic adenopathy is noted in the internal mammary chain  Thickening of the left adrenal is noted  December 15, 2015- Bronchoscopy proved positive for adenocarcinoma, TTF-1 positive  January 2016 rash  Started Alimta 500 mg per meter squared, carbo AUC-5, both given, q  21 days  March 2016- CT showed mixed response  Right chest Pleurx catheter placed  Chemotherapy continued on schedule  Interval History: Asept cath drainage has been diminishing, now about 6cc/wk  Review of Systems  Complete-Female:   Constitutional: No fever, no chills, feels well, no tiredness, no recent weight gain or weight loss and no recent weight loss  Eyes: No complaints of eye pain, no red eyes, no eyesight problems, no discharge, no dry eyes, no itching of eyes     ENT: no complaints of earache, no loss of hearing, no nose bleeds, no nasal discharge, no sore throat, no hoarseness  Cardiovascular: No complaints of slow heart rate, no fast heart rate, no chest pain, no palpitations, no leg claudication, no lower extremity edema  Respiratory: No complaints of shortness of breath, no wheezing, no cough, no SOB on exertion, no orthopnea, no PND  Gastrointestinal: 2013- colonoscopy-no polyps  , but No complaints of abdominal pain, no constipation, no nausea or vomiting, no diarrhea, no bloody stools  Genitourinary: No complaints of dysuria, no incontinence, no pelvic pain, no dysmenorrhea, no vaginal discharge or bleeding  Musculoskeletal: No complaints of arthralgias, no myalgias, no joint swelling or stiffness, no limb pain or swelling  Integumentary: No complaints of skin rash or lesions, no itching, no skin wounds, no breast pain or lump  Neurological: No complaints of headache, no confusion, no convulsions, no numbness, no dizziness or fainting, no tingling, no limb weakness, no difficulty walking  Psychiatric: Not suicidal, no sleep disturbance, no anxiety or depression, no change in personality, no emotional problems  Endocrine: No complaints of proptosis, no hot flashes, no muscle weakness, no deepening of the voice, no feelings of weakness  Hematologic/Lymphatic: No complaints of swollen glands, no swollen glands in the neck, does not bleed easily, does not bruise easily  Active Problems    1  Adenocarcinoma of right lung (162 9) (C34 91)   2  Colon cancer screening (V76 51) (Z12 11)   3  Cough (786 2) (R05)   4  Hyperlipidemia (272 4) (E78 5)   5  Lung mass (786 6) (R91 8)   6  Pleural effusion on right (511 9) (J90)   7  Pneumonia (486) (J18 9)   8  Post-menopausal osteoporosis (733 01) (M81 0)   9  Red eye (379 93) (H57 8)    Past Medical History    1  History of Encounter for routine gynecological examination (V72 31) (Z01 419)   2  History of Encounter for screening mammogram for malignant neoplasm of breast   (V76 12) (Z12 31)   3  History of Pneumonia (486) (J18 9)   4  History of Sinusitis (473 9) (J32 9)    Surgical History    1  History of Salpingo-oophorectomy Bilateral   2  History of Total Abdominal Hysterectomy    Family History  Mother    1  Family history of Diabetes (250 00) (E11 9)   2  Family history of Hypertension (401 9) (I10)  Father    3  Family history of Asthma (493 90) (J45 909)  Brother    4  Family history of malignant neoplasm of stomach (V16 0) (Z80 0)  Family History    5  Denied: Family history of colon cancer   6  Denied: Family history of malignant neoplasm of breast   7  Denied: Family history of malignant neoplasm of uterus   8  Denied: Family history of osteoporosis   9  Denied: Family history of Ovarian ca    Social History    · Caffeine Use   · Denied: Drug use (305 90) (F19 90)   · Former smoker (H67 40) (Z87 891)   · No alcohol use   · Sun Protection Sunscreen   · Uses Safety Equipment - Seatbelts    Current Meds   1  B Complex CAPS; Therapy: (Recorded:20Bxu9743) to Recorded   2  Calcium + D TABS; Take as directed per package instructions Recorded   3  Dexamethasone 4 MG Oral Tablet; TAKE 1 TABLET TWICE DAILY  START DAY BEFORE   CHEMO,DAY OF CHEMO, AND DAY AFTER CHEMO-3 DAYS TOTAL; Therapy: 30CQK1767 to (Evaluate:55Rzu9011)  Requested for: 13Vek5498; Last   Rx:31Mar2016 Ordered   4  Folic Acid 1 MG Oral Tablet; TAKE 1 TABLET DAILY AS DIRECTED; Therapy: 80QVA0057 to (Evaluate:65Pfs1747)  Requested for: 10CNO2162; Last   Rx:12Zjj4326 Ordered   5  Prochlorperazine Maleate 10 MG Oral Tablet; TAKE 1 TABLET EVERY 6 HOURS AS   NEEDED FOR NAUSEA; Therapy: 24KKF1614 to (Evaluate:34Tld4181)  Requested for: 56Qxi9117; Last   Rx:26Qkq6925 Ordered   6  Vitamin D 2000 UNIT Oral Tablet Recorded    Allergies    1  Tetracyclines    2   Shellfish    Vitals  Vital Signs [Data Includes: Current Encounter]    Recorded: C3631274 11:44AM   Temperature 97 5 F   Heart Rate 69   Respiration 18   Systolic 820   Diastolic 80 Height 5 ft 2 5 in   Weight 113 lb    BMI Calculated 20 34   BSA Calculated 1 51   O2 Saturation 95   Pain Scale 0     Physical Exam    Constitutional   General appearance: No acute distress, well appearing and well nourished  Eyes   Conjunctiva and lids: No swelling, erythema or discharge  Ears, Nose, Mouth, and Throat   External inspection of ears and nose: Normal     Oropharynx: Normal with no erythema, edema, exudate or lesions  Pulmonary   Auscultation of lungs: Clear to auscultation  Cardiovascular   Auscultation of heart: Normal rate and rhythm, normal S1 and S2, without murmurs  Examination of extremities for edema and/or varicosities: Normal     Abdomen   Abdomen: Non-tender, no masses  Liver and spleen: No hepatomegaly or splenomegaly  Lymphatic   Palpation of lymph nodes in neck: No lymphadenopathy  Musculoskeletal   Gait and station: Normal     Skin   Skin and subcutaneous tissue: Normal without rashes or lesions  Neurologic   Cranial nerves: Cranial nerves 2-12 intact  Psychiatric   Orientation to person, place, and time: Normal          Results/Data  Results   (1) CBC/PLT/DIFF 46IUY4099 08:22AM Duwaine Champion     Test Name Result Flag Reference   WBC COUNT 3 18 Thousand/uL L 4 31-10 16   RBC COUNT 3 09 Million/uL L 3 81-5 12   HEMOGLOBIN 10 2 g/dL L 11 5-15 4   HEMATOCRIT 31 6 % L 34 8-46  1    fL H 82-98   MCH 33 0 pg  26 8-34 3   MCHC 32 3 g/dL  31 4-37 4   RDW 14 8 %  11 6-15 1   MPV 8 8 fL L 8 9-12 7   PLATELET COUNT 228 Thousands/uL  149-390   NEUTROPHILS RELATIVE PERCENT 51 %  43-75   LYMPHOCYTES RELATIVE PERCENT 30 %  14-44   MONOCYTES RELATIVE PERCENT 19 % H 4-12   EOSINOPHILS RELATIVE PERCENT 0 %  0-6   BASOPHILS RELATIVE PERCENT 0 %  0-1   NEUTROPHILS ABSOLUTE COUNT 1 61 Thousands/?L L 1 85-7 62   LYMPHOCYTES ABSOLUTE COUNT 0 95 Thousands/?L  0 60-4 47   MONOCYTES ABSOLUTE COUNT 0 60 Thousand/?L  0 17-1 22   EOSINOPHILS ABSOLUTE COUNT 0 01 Thousand/?L 0  00-0 61   BASOPHILS ABSOLUTE COUNT 0 01 Thousands/?L  0 00-0 10     (1) COMPREHENSIVE METABOLIC PANEL 34KEN5450 47:75YS Neida Roby     Test Name Result Flag Reference   GLUCOSE,RANDM 109 mg/dL     If the patient is fasting, the ADA then defines impaired fasting glucose as > 100 mg/dL and diabetes as > or equal to 123 mg/dL  SODIUM 138 mmol/L  136-145   POTASSIUM 3 9 mmol/L  3 5-5 3   SLIGHTLY HEMOLYZED SAMPLE   CHLORIDE 100 mmol/L  100-108   CARBON DIOXIDE 29 mmol/L  21-32   ANION GAP (CALC) 9 mmol/L  4-13   BLOOD UREA NITROGEN 11 mg/dL  5-25   CREATININE 0 50 mg/dL L 0 60-1 30   Standardized to IDMS reference method   CALCIUM 8 6 mg/dL  8 3-10 1   BILI, TOTAL 0 40 mg/dL  0 20-1 00   ALK PHOSPHATAS 72 U/L     ALT (SGPT) 26 U/L  12-78   AST(SGOT) 36 U/L  5-45   ALBUMIN 3 1 g/dL L 3 5-5 0   TOTAL PROTEIN 7 8 g/dL  6 4-8 2   eGFR Non-African American      >60 0 ml/min/1 73sq m   [facility] Disease Education Program recommendations are as follows:  GFR calculation is accurate only with a steady state creatinine  Chronic Kidney disease less than 60 ml/min/1 73 sq  meters  Kidney failure less than 15 ml/min/1 73 sq  meters  * CT CHEST ABDOMEN PELVIS WO CONTRAST 52HCJ9797 07:07AM Neida Ca     Test Name Result Flag Reference   CT CHEST ABDOMEN PELVIS WO CONTRAST (Report)     CT CHEST, ABDOMEN AND PELVIS WITHOUT IV CONTRAST     INDICATION: History of lung cancer  Follow-up  COMPARISON: CT from March 8, 2016  TECHNIQUE: CT examination of the chest, abdomen and pelvis was performed  This examination, like all CT scans performed in the [facility], was performed utilizing techniques to minimize radiation dose exposure, including the use of    iterative reconstruction and automated exposure control  Axial, sagittal, and coronal reformatted images were submitted for interpretation  Intravenous contrast was not administered as part of this examination   This limits evaluation of the pulmonary    tracie and the abdominal and pelvic viscera  Enteric contrast was administered  CHEST     LUNGS: Mass in the base of the right lower lobe, increased in size since the last study, now measuring 2 3 x 5 3 cm  Right hilar mass difficult to measure in the absence of intravenous contrast  It is approximately 2 8 x 3 5 cm  Previously, it    measured 2 7 x 2 2 cm  Soft tissue density extends inferomedially from the mass, much of this atelectasis in the medial segment of the right lobe  Coexisting tumor cannot be excluded  Several pleural-based nodules in the right lower lobe unchanged in    size  Left lung clear  PLEURA: Right-sided chest tube in place  Small right pleural effusion, extending into the major fissure, much smaller now than on the last CT  Left pleural space clear  HEART/GREAT VESSELS: Unremarkable for patient's age  MEDIASTINUM AND TRACIE: Right hilar mass as described above  Subcarinal lymph node with short axis diameter of 1 2 cm  Additional subcentimeter sized nodes  Esophagus normal in appearance  Trachea and mainstem bronchi normal      CHEST WALL AND LOWER NECK: Unremarkable  ABDOMEN     LIVER/BILIARY TREE: Evaluation limited without intravenous contrast  No gross evidence of liver mass  Bile ducts normal in caliber  GALLBLADDER: No calcified gallstones  No pericholecystic inflammatory change  SPLEEN: Unremarkable  PANCREAS: Unremarkable  ADRENAL GLANDS: Unremarkable  KIDNEYS/URETERS: Unremarkable  No hydronephrosis  STOMACH AND BOWEL: Unremarkable  APPENDIX: No findings to suggest appendicitis  ABDOMINOPELVIC CAVITY: No lymphadenopathy or mass  No ascites  No extraluminal gas  VESSELS: Unremarkable for patient's age  PELVIS     REPRODUCTIVE ORGANS: Post hysterectomy  No adnexal masses  URINARY BLADDER: Unremarkable  ABDOMINAL WALL/INGUINAL REGIONS: Unremarkable       OSSEOUS STRUCTURES: No acute fracture or destructive osseous lesion  IMPRESSION:     1  Study limited due to lack of intravenous contrast administration  2  Decrease in size of right pleural effusion, now small, following right chest tube insertion  3  Apparent slight interval enlargement of right hilar mass since CT from March 8, 2016    4  Increase in size of pulmonary mass in the base of the right lower lobe  5  No evidence of metastases in the abdomen, pelvis or included portions of the skeleton  Workstation performed: [id number]     Signed by:   Angela Aviles MD   5/19/16     Health Management  Health Maintenance   Medicare Annual Wellness Visit; every 1 year; Last 30XVM0570; Next Due: [id number];  Active    Future Appointments    Date/Time Provider Specialty Site   06/17/2016 01:15 PM TRINITY Prather , DO, [facility]   06/20/2016 09:30 AM Toro Mosley MD Obstetrics/Gynecology [address]   Electronically signed by : TRINITY Brennan DOM D ,DO; May 20 2016  4:30PM EST                       (Author)

## 2018-01-11 NOTE — PROGRESS NOTES
Assessment    1  Adenocarcinoma of right lung (162 9) (C34 91)    Plan  Adenocarcinoma of right lung    · Drink plenty of fluids ; Status:Complete;   Done: 58RXU2898   Ordered; For:Adenocarcinoma of right lung; Ordered By:Uyen Reis;   · Follow-up visit in 3 weeks Evaluation and Treatment  Follow-up  Status: Hold For -  Scheduling  Requested for: 76HEZ4236   Ordered; For: Adenocarcinoma of right lung; Ordered By: Marti Rangel Performed:  Due: 44NQT5834    Discussion/Summary  Discussion Summary:   In summary, this is a 80-year-old female history of adenocarcinoma the lung as outlined  Recent CAT scan shows increased in her primary tumor mass, as well as pleural nodularity  Possible hepatic metastases were also noted  She has some pains in the right lateral chest wall which are occasional and fluctuating  She requires no analgesia for these  Additionally, she has some pain in the right elbow, which she attributes to recent direct exposure to air-conditioning  She states this may be improving over the past week or 2  We reviewed the results of her CAT scan  One could consider the possibility of immunotherapy related pseudo-progression  This is an uncommon phenomenon but is important since treatment would be recommended to continue  Alternatively, progressive disease could be present and would indicate a change in treatment  Biopsy could be helpful to distinguish between these as well as reassess any molecular markers that may be informative as to future treatment options/research trials, etc   I reviewed the above with the patient and her daughter  She was agreeable to proceed with biopsy  She tells me that she has an appointment in about a week or so  At [facility]  I certainly agree that is reasonable and await the results of that evaluation  I'll be reviewing her case at lung cancer working group on Monday and let her know the results of this conversation    The patient and her daughter voiced understanding and agreement  We will be holding up to rule out this time pending further decisions  Understands and agrees with treatment plan: The treatment plan was reviewed with the patient/guardian  The patient/guardian understands and agrees with the treatment plan   Counseling Documentation With Imm: The patient was counseled regarding diagnostic results, instructions for management, patient and family education, impressions  total time of encounter was 40 minutes  Chief Complaint  Chief Complaint Free Text Note Form: Follow-up regarding lung cancer  History of Present Illness  HPI: September 2015- started with a cough  Had small volume hemoptysis  Saw PCP  Got CXR which showed right middle lobe airspace disease, small, right pleural effusion  CT scan of the chest showed 4 1 cm right hilar mass with obstruction of the right middle lobe bronchus  Tumor extends along, right hilum encasing the right middle lobe pulmonary artery  Numerous pleural-based nodules are noted on the right  Borderline possibly necrotic adenopathy is noted in the internal mammary chain  Thickening of the left adrenal is noted  December 15, 2015- Bronchoscopy proved positive for adenocarcinoma, TTF-1 positive  January 2016 rash  Started Alimta 500 mg per meter squared, carbo AUC-5, both given, q  21 days  March 2016- CT showed mixed response  Right chest Pleurx catheter placed  Chemotherapy continued on schedule  May 20, 2016- CT showed progressive disease  Alimta and carboplatin discontinued  Opdivo 3 mg/kg IV every 2 weeks initiated  Current Therapy: May 20, 2016- CT showed progressive disease  Alimta and carboplatin discontinued  Opdivo 3 mg/kg IV every 2 weeks initiated  Interval History: Asept cath drainage has been diminishing, now about 6cc/wk  Review of Systems  Complete-Female:   Constitutional: No fever, no chills, feels well, no tiredness, no recent weight gain or weight loss and no recent weight loss     Eyes: No complaints of eye pain, no red eyes, no eyesight problems, no discharge, no dry eyes, no itching of eyes  ENT: no complaints of earache, no loss of hearing, no nose bleeds, no nasal discharge, no sore throat, no hoarseness  Cardiovascular: No complaints of slow heart rate, no fast heart rate, no chest pain, no palpitations, no leg claudication, no lower extremity edema  Respiratory: No complaints of shortness of breath, no wheezing, no cough, no SOB on exertion, no orthopnea, no PND  Gastrointestinal: 2013- colonoscopy-no polyps  , but No complaints of abdominal pain, no constipation, no nausea or vomiting, no diarrhea, no bloody stools  Genitourinary: No complaints of dysuria, no incontinence, no pelvic pain, no dysmenorrhea, no vaginal discharge or bleeding  Musculoskeletal: No complaints of arthralgias, no myalgias, no joint swelling or stiffness, no limb pain or swelling  Integumentary: No complaints of skin rash or lesions, no itching, no skin wounds, no breast pain or lump  Neurological: No complaints of headache, no confusion, no convulsions, no numbness, no dizziness or fainting, no tingling, no limb weakness, no difficulty walking  Psychiatric: Not suicidal, no sleep disturbance, no anxiety or depression, no change in personality, no emotional problems  Endocrine: No complaints of proptosis, no hot flashes, no muscle weakness, no deepening of the voice, no feelings of weakness  Hematologic/Lymphatic: No complaints of swollen glands, no swollen glands in the neck, does not bleed easily, does not bruise easily  Active Problems    1  Adenocarcinoma of right lung (162 9) (C34 91)   2  Anxiety (300 00) (F41 9)   3  Colon cancer screening (V76 51) (Z12 11)   4  Cough (786 2) (R05)   5  Encounter for routine gynecological examination (V72 31) (Z01 419)   6  Encounter for screening mammogram for malignant neoplasm of breast (V76 12)   (Z12 31)   7  Hyperlipidemia (272 4) (E78 5)   8   Lung mass (786 6) (R91 8)   9  Pleural effusion on right (511 9) (J90)   10  Pneumonia (486) (J18 9)   11  Post-menopausal osteoporosis (733 01) (M81 0)   12  Red eye (379 93) (H57 8)    Past Medical History    1  History of Encounter for screening mammogram for malignant neoplasm of breast   (V76 12) (Z12 31)   2  History of Pneumonia (486) (J18 9)   3  History of Sinusitis (473 9) (J32 9)    Surgical History    1  History of Salpingo-oophorectomy Bilateral   2  History of Total Abdominal Hysterectomy    Family History  Mother    1  Family history of Diabetes (250 00) (E11 9)   2  Family history of Hypertension (401 9) (I10)  Father    3  Family history of Asthma (493 90) (J45 909)  Brother    4  Family history of malignant neoplasm of stomach (V16 0) (Z80 0)  Family History    5  Denied: Family history of colon cancer   6  Denied: Family history of malignant neoplasm of breast   7  Denied: Family history of malignant neoplasm of uterus   8  Denied: Family history of osteoporosis   9  Denied: Family history of Ovarian ca    Social History    · Caffeine Use   · Denied: Drug use (305 90) (F19 90)   · Former smoker (X27 80) (Z41 617)   · No alcohol use   · Sun Protection Sunscreen   · Uses Safety Equipment - Seatbelts  Social History Reviewed: The social history was reviewed and updated today  Current Meds   1  B Complex CAPS; Therapy: (Recorded:87Vgv4280) to Recorded   2  Calcium + D TABS; Take as directed per package instructions Recorded   3  LORazepam 0 5 MG Oral Tablet; TAKE 1 TABLET Every 8 hours; Therapy: 56UMS2211 to (Evaluate:05Jun2016); Last Rx:55Epy5868 Ordered   4  Prochlorperazine Maleate 10 MG Oral Tablet; TAKE 1 TABLET EVERY 6 HOURS AS   NEEDED FOR NAUSEA; Therapy: 57PBZ0077 to (Evaluate:98Qnt2868)  Requested for: 70Bwt8031; Last   Rx:34Adi0607 Ordered   5  Vitamin D 2000 UNIT Oral Tablet Recorded   6  Vitamin E TABS; Therapy: (Recorded:17Czi6922) to Recorded    Allergies    1   Tetracyclines 2  Shellfish    Vitals  Vital Signs [Data Includes: Current Encounter]    Recorded: 18Pac8563 04:16PM   Temperature 97 7 F   Heart Rate 87   Respiration 18   Systolic 719   Diastolic 86   Height 5 ft 2 5 in   Weight 109 lb 6 oz   BMI Calculated 19 69   BSA Calculated 1 49   O2 Saturation 94     Physical Exam    Constitutional   General appearance: No acute distress, well appearing and well nourished  Eyes   Conjunctiva and lids: No swelling, erythema or discharge  Ears, Nose, Mouth, and Throat   External inspection of ears and nose: Normal     Oropharynx: Normal with no erythema, edema, exudate or lesions  Pulmonary   Auscultation of lungs: Clear to auscultation  Cardiovascular   Auscultation of heart: Normal rate and rhythm, normal S1 and S2, without murmurs  Examination of extremities for edema and/or varicosities: Normal     Abdomen   Abdomen: Non-tender, no masses  Liver and spleen: No hepatomegaly or splenomegaly  Lymphatic   Palpation of lymph nodes in neck: No lymphadenopathy  Musculoskeletal   Gait and station: Normal     Skin   Skin and subcutaneous tissue: Normal without rashes or lesions  Neurologic   Cranial nerves: Cranial nerves 2-12 intact  Psychiatric   Orientation to person, place, and time: Normal          Health Management  Health Maintenance   Medicare Annual Wellness Visit; every 1 year; Last 91KIS6210; Next Due: [id number];  Active    Signatures   Electronically signed by : TRINITY Carter D ,DO; Jul 15 2016  5:00PM EST                       (Author)

## 2018-01-11 NOTE — PROGRESS NOTES
Assessment    1  Adenocarcinoma of right lung (162 9) (C34 91)   2  Pleural effusion on right (511 9) (J90)    Plan  Pleural effusion on right    · Drink plenty of fluids ; Status:Complete;   Done: 38TKU0189   Ordered; For:Pleural effusion on right; Ordered By:Katie Reis;   · Follow-up visit in 3 weeks Evaluation and Treatment  Follow-up  Status: Hold For -  Scheduling  Requested for: 29Apr2016   Ordered; For: Pleural effusion on right; Ordered By: Nicole Pat Performed:  Due: 59EZE3007   · * CT CHEST ABDOMEN PELVIS W CONTRAST; Status:Hold For - Scheduling; Requested  for:12Jdj5967;    Perform:[facility]; CBT:26JHE7267;LMCRKOV; For:Pleural effusion on right; Ordered By:Katie Reis;    Discussion/Summary  Discussion Summary:   In summary, this is a 68-year-old female history of adenocarcinoma of the lung with pleural effusion as outlined  She has been on Alimta, carboplatin  She's tolerating this fairly well  She has borderline leukopenia, which has had no clinical impact  Hemoglobin has stabilized at 9 7  Again, this is asymptomatic  We reviewed that CAT scan is indicated in the near future  If the CAT scan shows stable or responding disease  Treatment  Will be skeletal back to maintenance Alimta  Her pleural effusion is minimal and drainage has diminished  I would anticipate that her, catheter we'll probably be removed sooner than later  Overall she's doing as well as could be expected  Her performance status is excellent and she is pleased with her current quality of life  I reviewed the above with the patient and her daughter  They voiced understanding and agreement  Understands and agrees with treatment plan: The treatment plan was reviewed with the patient/guardian   The patient/guardian understands and agrees with the treatment plan   Counseling Documentation With Imm: The patient was counseled regarding diagnostic results, instructions for management, patient and family education, impressions  total time of encounter was 25 minutes  Chief Complaint  Chief Complaint Free Text Note Form: Followup regarding lung cancer      History of Present Illness  HPI: September 2015- started with a cough  Had small volume hemoptysis  Saw PCP  Got CXR which showed right middle lobe airspace disease, small, right pleural effusion  CT scan of the chest showed 4 1 cm right hilar mass with obstruction of the right middle lobe bronchus  Tumor extends along, right hilum encasing the right middle lobe pulmonary artery  Numerous pleural-based nodules are noted on the right  Borderline possibly necrotic adenopathy is noted in the internal mammary chain  Thickening of the left adrenal is noted  December 15, 2015- Bronchoscopy proved positive for adenocarcinoma, TTF-1 positive  January 2016 rash  Started Alimta 500 mg per meter squared, carbo AUC-5, both given, q  21 days  March 2016- CT showed mixed response  Right chest Pleurx catheter placed  Chemotherapy continued on schedule  Interval History: Asept cath drainage has been diminishing, now about 6cc/wk  Review of Systems  Complete-Female:   Constitutional: No fever, no chills, feels well, no tiredness, no recent weight gain or weight loss and no recent weight loss  Eyes: No complaints of eye pain, no red eyes, no eyesight problems, no discharge, no dry eyes, no itching of eyes  ENT: no complaints of earache, no loss of hearing, no nose bleeds, no nasal discharge, no sore throat, no hoarseness  Cardiovascular: No complaints of slow heart rate, no fast heart rate, no chest pain, no palpitations, no leg claudication, no lower extremity edema  Respiratory: No complaints of shortness of breath, no wheezing, no cough, no SOB on exertion, no orthopnea, no PND  Gastrointestinal: 2013- colonoscopy-no polyps  , but No complaints of abdominal pain, no constipation, no nausea or vomiting, no diarrhea, no bloody stools     Genitourinary: No complaints of dysuria, no incontinence, no pelvic pain, no dysmenorrhea, no vaginal discharge or bleeding  Musculoskeletal: No complaints of arthralgias, no myalgias, no joint swelling or stiffness, no limb pain or swelling  Integumentary: No complaints of skin rash or lesions, no itching, no skin wounds, no breast pain or lump  Neurological: No complaints of headache, no confusion, no convulsions, no numbness, no dizziness or fainting, no tingling, no limb weakness, no difficulty walking  Psychiatric: Not suicidal, no sleep disturbance, no anxiety or depression, no change in personality, no emotional problems  Endocrine: No complaints of proptosis, no hot flashes, no muscle weakness, no deepening of the voice, no feelings of weakness  Hematologic/Lymphatic: No complaints of swollen glands, no swollen glands in the neck, does not bleed easily, does not bruise easily  Active Problems    1  Adenocarcinoma of right lung (162 9) (C34 91)   2  Colon cancer screening (V76 51) (Z12 11)   3  Cough (786 2) (R05)   4  Hyperlipidemia (272 4) (E78 5)   5  Lung mass (786 6) (R91 8)   6  Pleural effusion on right (511 9) (J90)   7  Pneumonia (486) (J18 9)   8  Post-menopausal osteoporosis (733 01) (M81 0)   9  Red eye (379 93) (H57 8)    Past Medical History    1  History of Encounter for routine gynecological examination (V72 31) (Z01 419)   2  History of Encounter for screening mammogram for malignant neoplasm of breast   (V76 12) (Z12 31)   3  History of Pneumonia (486) (J18 9)   4  History of Sinusitis (473 9) (J32 9)    Surgical History    1  History of Salpingo-oophorectomy Bilateral   2  History of Total Abdominal Hysterectomy    Family History  Mother    1  Family history of Diabetes (250 00) (E11 9)   2  Family history of Hypertension (401 9) (I10)  Father    3  Family history of Asthma (493 90) (J45 909)  Brother    4  Family history of malignant neoplasm of stomach (V16 0) (Z80 0)  Family History    5   Denied: Family history of colon cancer   6  Denied: Family history of malignant neoplasm of breast   7  Denied: Family history of malignant neoplasm of uterus   8  Denied: Family history of osteoporosis   9  Denied: Family history of Ovarian ca    Social History    · Caffeine Use   · Denied: Drug use (305 90) (F19 90)   · Former smoker (K80 88) (Z87 891)   · No alcohol use   · Sun Protection Sunscreen   · Uses Safety Equipment - Seatbelts    Current Meds   1  B Complex CAPS; Therapy: (Recorded:07Tla0901) to Recorded   2  Calcium + D TABS; Take as directed per package instructions Recorded   3  Dexamethasone 4 MG Oral Tablet; TAKE 1 TABLET TWICE DAILY  START DAY BEFORE   CHEMO,DAY OF CHEMO, AND DAY AFTER CHEMO-3 DAYS TOTAL; Therapy: 80NWM2135 to (Evaluate:99Ewj8084)  Requested for: 07Apr2016; Last   Rx:31Mar2016 Ordered   4  Folic Acid 1 MG Oral Tablet; TAKE 1 TABLET DAILY AS DIRECTED; Therapy: 91SJA8704 to (Evaluate:65Nmo5090)  Requested for: 92FFC5082; Last   Rx:29Owa5022 Ordered   5  Prochlorperazine Maleate 10 MG Oral Tablet; TAKE 1 TABLET EVERY 6 HOURS AS   NEEDED FOR NAUSEA; Therapy: 55SMU7208 to (Evaluate:57Zcn8806)  Requested for: 10Zxq4498; Last   Rx:38Txo2485 Ordered   6  Vitamin D 2000 UNIT Oral Tablet Recorded    Allergies    1  Tetracyclines    2  Shellfish    Vitals  Vital Signs [Data Includes: Current Encounter]    Recorded: 29Apr2016 10:21AM   Temperature 96 3 F   Heart Rate 70   Respiration 18   Systolic 906   Diastolic 70   Height 5 ft 2 in   Weight 113 lb 6 08 oz   BMI Calculated 20 74   BSA Calculated 1 5   O2 Saturation 94   Pain Scale 0     Physical Exam    Constitutional   General appearance: No acute distress, well appearing and well nourished  Eyes   Conjunctiva and lids: No swelling, erythema or discharge  Ears, Nose, Mouth, and Throat   External inspection of ears and nose: Normal     Oropharynx: Normal with no erythema, edema, exudate or lesions      Pulmonary   Auscultation of lungs: Clear to auscultation  Cardiovascular   Auscultation of heart: Normal rate and rhythm, normal S1 and S2, without murmurs  Examination of extremities for edema and/or varicosities: Normal     Abdomen   Abdomen: Non-tender, no masses  Liver and spleen: No hepatomegaly or splenomegaly  Lymphatic   Palpation of lymph nodes in neck: No lymphadenopathy  Musculoskeletal   Gait and station: Normal     Skin   Skin and subcutaneous tissue: Normal without rashes or lesions  Neurologic   Cranial nerves: Cranial nerves 2-12 intact  Psychiatric   Orientation to person, place, and time: Normal          Health Management  Health Maintenance   Medicare Annual Wellness Visit; every 1 year; Last 39OQA6926; Next Due: [id number]; Active    Signatures   Electronically signed by : TRINITY Blanco DOM D ,DO;  Apr 29 2016 10:59AM EST                       (Author)

## 2018-01-11 NOTE — MISCELLANEOUS
Message  Patient to start on Lisinopril 2 5mg PO daily  BP has been elevated due to chemotherapy  Reviewed instructions with patient- she will have her BP checked daily and call with any questions or concerns      Active Problems    1  Adenocarcinoma of right lung (162 9) (C34 91)   2  Anxiety (300 00) (F41 9)   3  Colon cancer screening (V76 51) (Z12 11)   4  Cough (786 2) (R05)   5  Encounter for routine gynecological examination (V72 31) (Z01 419)   6  Encounter for screening mammogram for malignant neoplasm of breast (V76 12)   (Z12 31)   7  Hyperlipidemia (272 4) (E78 5)   8  Lung mass (786 6) (R91 8)   9  Pleural effusion on right (511 9) (J90)   10  Pneumonia (486) (J18 9)   11  Post-menopausal osteoporosis (733 01) (M81 0)   12  Red eye (379 93) (H57 8)    Current Meds   1  B Complex CAPS; Therapy: (Recorded:08Qsj7514) to Recorded   2  Calcium + D TABS; Take as directed per package instructions Recorded   3  LORazepam 0 5 MG Oral Tablet; TAKE 1 TABLET Every 8 hours; Therapy: 54XMI0696 to (Evaluate:92Xtr9726); Last Rx:21Mjw5741 Ordered   4  Vitamin D 2000 UNIT Oral Tablet Recorded   5  Vitamin E TABS; Therapy: (Recorded:08Rtw0310) to Recorded    Allergies    1  Tetracyclines    2   Shellfish    Plan  Health Maintenance    · Lisinopril 2 5 MG Oral Tablet; TAKE 1 TABLET DAILY    Signatures   Electronically signed by : Mickey Abdullahi, ; Aug 24 2016  8:22AM EST                       (Author)

## 2018-01-12 VITALS
BODY MASS INDEX: 18.63 KG/M2 | HEIGHT: 63 IN | DIASTOLIC BLOOD PRESSURE: 85 MMHG | HEART RATE: 92 BPM | SYSTOLIC BLOOD PRESSURE: 145 MMHG | TEMPERATURE: 98.4 F | WEIGHT: 105.13 LBS

## 2018-01-12 VITALS
BODY MASS INDEX: 17.05 KG/M2 | RESPIRATION RATE: 16 BRPM | HEART RATE: 76 BPM | WEIGHT: 96.2 LBS | HEIGHT: 63 IN | OXYGEN SATURATION: 98 %

## 2018-01-12 VITALS — DIASTOLIC BLOOD PRESSURE: 78 MMHG | SYSTOLIC BLOOD PRESSURE: 122 MMHG

## 2018-01-13 VITALS
HEART RATE: 78 BPM | WEIGHT: 98 LBS | DIASTOLIC BLOOD PRESSURE: 70 MMHG | OXYGEN SATURATION: 92 % | SYSTOLIC BLOOD PRESSURE: 112 MMHG | BODY MASS INDEX: 17.36 KG/M2 | HEIGHT: 63 IN | TEMPERATURE: 96.9 F

## 2018-01-13 VITALS
WEIGHT: 97.38 LBS | DIASTOLIC BLOOD PRESSURE: 68 MMHG | SYSTOLIC BLOOD PRESSURE: 108 MMHG | HEIGHT: 63 IN | TEMPERATURE: 98.3 F | BODY MASS INDEX: 17.25 KG/M2 | OXYGEN SATURATION: 96 % | HEART RATE: 78 BPM | RESPIRATION RATE: 18 BRPM

## 2018-01-13 VITALS
TEMPERATURE: 96.7 F | HEART RATE: 64 BPM | WEIGHT: 93.5 LBS | RESPIRATION RATE: 16 BRPM | HEIGHT: 63 IN | OXYGEN SATURATION: 96 % | SYSTOLIC BLOOD PRESSURE: 118 MMHG | DIASTOLIC BLOOD PRESSURE: 68 MMHG | BODY MASS INDEX: 16.57 KG/M2

## 2018-01-13 VITALS
WEIGHT: 109 LBS | HEART RATE: 86 BPM | HEIGHT: 63 IN | RESPIRATION RATE: 18 BRPM | DIASTOLIC BLOOD PRESSURE: 72 MMHG | BODY MASS INDEX: 19.31 KG/M2 | TEMPERATURE: 96.6 F | OXYGEN SATURATION: 91 % | SYSTOLIC BLOOD PRESSURE: 116 MMHG

## 2018-01-13 VITALS
DIASTOLIC BLOOD PRESSURE: 75 MMHG | OXYGEN SATURATION: 94 % | WEIGHT: 107.4 LBS | HEIGHT: 63 IN | SYSTOLIC BLOOD PRESSURE: 120 MMHG | HEART RATE: 98 BPM | RESPIRATION RATE: 18 BRPM | BODY MASS INDEX: 19.03 KG/M2

## 2018-01-13 VITALS
SYSTOLIC BLOOD PRESSURE: 149 MMHG | HEART RATE: 85 BPM | WEIGHT: 106.5 LBS | TEMPERATURE: 97.8 F | HEIGHT: 63 IN | DIASTOLIC BLOOD PRESSURE: 87 MMHG | BODY MASS INDEX: 18.87 KG/M2

## 2018-01-13 VITALS
OXYGEN SATURATION: 96 % | SYSTOLIC BLOOD PRESSURE: 140 MMHG | HEIGHT: 63 IN | BODY MASS INDEX: 19.14 KG/M2 | TEMPERATURE: 98.6 F | HEART RATE: 96 BPM | DIASTOLIC BLOOD PRESSURE: 72 MMHG | WEIGHT: 108 LBS

## 2018-01-13 NOTE — PROCEDURES
Procedures by Evon Gipson PA-C at 8/5/2017   1:08 PM      Author:  Evon Gipson PA-C Service:  Critical Care/ICU Author Type:  Physician Assistant    Filed:  8/5/2017  1:09 PM Date of Service:  8/5/2017  1:08 PM Status:  Attested    :  Evon Gipson PA-C (Physician Assistant)  Cosigner:  Antwon Marie DO at 8/5/2017  1:13 PM      Procedure Orders:       1  Insert arterial line [id number] ordered by Evon Gipson PA-C at 08/05/17 1308                 Post-procedure Diagnoses:       1  Shock circulatory [R57 9]              Attestation signed by Antwon Marie DO at 8/5/2017  1:13 PM      I was present and observed and/or assisted with the procedure(s) performed  This does not include critical care time  Arterial Line  Insertion  Date/Time: 8/5/2017 1:08 PM  Performed by: Sapphire Rios by: Taj Roth     Patient location:  Bedside  Other Assisting Provider:  Yes (comment) (Dr Kelsey Corona)    Consent:     Consent obtained:  Emergent situation  Universal protocol:     Site/side marked: yes      Patient identity confirmed:  Arm band and hospital-assigned identification number  Indications:     Indications: hemodynamic monitoring and continuous blood pressure monitoring    Pre-procedure details:     Skin preparation:  Chlorhexidine  Anesthesia (see MAR for exact dosages): Anesthesia method:  Local infiltration    Local anesthetic:  Lidocaine 1% w/o epi (3 mL)  Procedure details:     Location / Tip of Catheter:  Femoral    Laterality:  Right    Placement technique:  Percutaneous    Number of attempts:  1    Successful placement: yes      Transducer: waveform confirmed    Post-procedure details:     Post-procedure:  Biopatch applied and sutured    Patient tolerance of procedure:   Tolerated well, no immediate complications                     Received for:Provider  [facility]   Aug  5 2017  1:13PM [facility] Standard Time

## 2018-01-13 NOTE — MISCELLANEOUS
Message  Received a call from patient unsure as to whether to continue the [address]  She is almost out of them  I spoke with Dr Jose Alfredo Baxter who authorized a refill of Keppra 500 mg q12 hours x 1 month supply with 3 refills  This was called in to her pharmacy and she was called and told to continue on the [address] until we have the results of her next MRI  She verbalized understanding      Active Problems    1  Acute pericarditis (420 90) (I30 9)   2  Adenocarcinoma of right lung (162 9) (C34 91)   3  Advance directive discussed with patient (V65 49) (Z71 89)   4  Anxiety (300 00) (F41 9)   5  Benign essential hypertension (401 1) (I10)   6  Colon cancer screening (V76 51) (Z12 11)   7  Cough (786 2) (R05)   8  Encounter for routine gynecological examination (V72 31) (Z01 419)   9  Encounter for screening mammogram for malignant neoplasm of breast (V76 12)   (Z12 31)   10  Hyperlipidemia (272 4) (E78 5)   11  Hypertension (401 9) (I10)   12  Liver metastases (197 7) (C78 7)   13  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)   14  Metastasis to brain (198 3) (C79 31)   15  Pleural effusion on right (511 9) (J90)   16  Pneumonia (486) (J18 9)   17  Poor appetite (783 0) (R63 0)   18  Post-menopausal osteoporosis (733 01) (M81 0)   19  Refused influenza vaccine (V64 06) (Z28 21)   20  Shortness of breath (786 05) (R06 02)    Current Meds   1  B Complex CAPS; Therapy: (Recorded:31Dmg9582) to Recorded   2  Calcium + D TABS; Take as directed per package instructions Recorded   3  Decadron TABS; 1 tab twice daily; Therapy: (Recorded:35Rkq6107) to Recorded   4  Keppra TABS; TAKE 1 TABLET TWICE DAILY; Therapy: (Recorded:34Ypj9177) to Recorded   5  Lidocaine-Prilocaine 2 5-2 5 % External Cream; APPLY 1 HOUR BEFORE PROCEDURE   AS DIRECTED; Therapy: 20JGY5467 to (Evaluate:56Vck8100)  Requested for: 35ZBB1242; Last   Rx:11Nov2016 Ordered   6  Lisinopril 20 MG Oral Tablet;  Take 1 tablet twice daily  Requested for: 04Bio1018; Last   Rx:68Igr9621 Ordered   7  Metoprolol Tartrate 50 MG Oral Tablet; TAKE 1 TABLET EVERY 12 HOURS DAILY; Therapy: (Recorded:87Qzu5794) to Recorded   8  Vitamin D 2000 UNIT Oral Tablet Recorded   9  Vitamin E TABS; Therapy: (Recorded:14Yrd9557) to Recorded    Allergies    1  Tetracyclines    2   Shellfish    Signatures   Electronically signed by : Janine Taylor RN; Mar  6 2017  3:09PM EST                       (Author)

## 2018-01-14 VITALS
HEIGHT: 63 IN | HEART RATE: 106 BPM | OXYGEN SATURATION: 94 % | SYSTOLIC BLOOD PRESSURE: 144 MMHG | TEMPERATURE: 97.9 F | WEIGHT: 108.5 LBS | BODY MASS INDEX: 19.22 KG/M2 | DIASTOLIC BLOOD PRESSURE: 100 MMHG | RESPIRATION RATE: 18 BRPM

## 2018-01-14 VITALS
BODY MASS INDEX: 18.61 KG/M2 | WEIGHT: 105 LBS | HEART RATE: 84 BPM | HEIGHT: 63 IN | DIASTOLIC BLOOD PRESSURE: 84 MMHG | SYSTOLIC BLOOD PRESSURE: 124 MMHG

## 2018-01-14 VITALS
HEIGHT: 63 IN | SYSTOLIC BLOOD PRESSURE: 147 MMHG | BODY MASS INDEX: 20.02 KG/M2 | HEART RATE: 83 BPM | DIASTOLIC BLOOD PRESSURE: 74 MMHG | WEIGHT: 113 LBS

## 2018-01-14 VITALS
TEMPERATURE: 97.1 F | WEIGHT: 106 LBS | DIASTOLIC BLOOD PRESSURE: 74 MMHG | OXYGEN SATURATION: 94 % | HEART RATE: 86 BPM | RESPIRATION RATE: 18 BRPM | HEIGHT: 63 IN | SYSTOLIC BLOOD PRESSURE: 128 MMHG | BODY MASS INDEX: 18.78 KG/M2

## 2018-01-14 VITALS
TEMPERATURE: 96.8 F | OXYGEN SATURATION: 99 % | SYSTOLIC BLOOD PRESSURE: 116 MMHG | RESPIRATION RATE: 18 BRPM | WEIGHT: 102.25 LBS | HEART RATE: 88 BPM | HEIGHT: 63 IN | DIASTOLIC BLOOD PRESSURE: 68 MMHG | BODY MASS INDEX: 18.12 KG/M2

## 2018-01-15 VITALS
SYSTOLIC BLOOD PRESSURE: 108 MMHG | OXYGEN SATURATION: 98 % | DIASTOLIC BLOOD PRESSURE: 68 MMHG | TEMPERATURE: 97.4 F | HEART RATE: 76 BPM | RESPIRATION RATE: 18 BRPM | WEIGHT: 104.13 LBS | BODY MASS INDEX: 18.45 KG/M2 | HEIGHT: 63 IN

## 2018-01-15 NOTE — PROGRESS NOTES
Assessment    1  Adenocarcinoma of right lung (162 9) (C34 91)   2  Liver metastases (197 7) (C78 7)    Plan  Liver metastases    · Drink plenty of fluids ; Status:Complete;   Done: 21Oct2016   Ordered; For:Liver metastases; Ordered By:Santa Reis Simpler;   · Follow-up visit in 3 weeks Evaluation and Treatment  Follow-up  Status: Hold For -  Scheduling  Requested for: 92WRO5246   Ordered; For: Liver metastases; Ordered By: Xiomara Rich Performed:  Due: 18MTN8225    Discussion/Summary  Discussion Summary:   In summary, this is a 57-year-old female history of adenocarcinoma the lung as outlined  She's currently on Taxotere and Cyramza  She's tolerating this relatively well  She has some somnolence on the day of her chemotherapy  This is attributable to Benadryl  We will decrease her dose from 50 mg to 25 mg  Additionally, she notes some irritability and tearfulness for several days after chemotherapy  This is likely attributable to Decadron  This dose will be decreased from 10 mg to 4 mg  She had some questions about cultural differences and chemotherapy administration  Generally, chemotherapy, surgery administered independent of genetic background  Overall she's doing as well as could be expected  I'll see her back in 3 weeks for review  Repeat imaging in late November  The patient and her  voiced understanding and agreement  Understands and agrees with treatment plan: The treatment plan was reviewed with the patient/guardian  The patient/guardian understands and agrees with the treatment plan   Counseling Documentation With Imm: The patient was counseled regarding diagnostic results, instructions for management, patient and family education, impressions  total time of encounter was 40 minutes  Chief Complaint  Chief Complaint Free Text Note Form: Follow-up regarding lung cancer  History of Present Illness  HPI: September 2015- started with a cough  Had small volume hemoptysis  Saw PCP   Got CXR which showed right middle lobe airspace disease, small, right pleural effusion  CT scan of the chest showed 4 1 cm right hilar mass with obstruction of the right middle lobe bronchus  Tumor extends along, right hilum encasing the right middle lobe pulmonary artery  Numerous pleural-based nodules are noted on the right  Borderline possibly necrotic adenopathy is noted in the internal mammary chain  Thickening of the left adrenal is noted  December 15, 2015- Bronchoscopy proved positive for adenocarcinoma, TTF-1 positive  January 2016 rash  Started Alimta 500 mg per meter squared, carbo AUC-5, both given, q  21 days  March 2016- CT showed mixed response  Right chest Pleurx catheter placed  Chemotherapy continued on schedule  May 20, 2016- CT showed progressive disease  Alimta and carboplatin discontinued  Opdivo 3 mg/kg IV every 2 weeks initiated  July 2016-CT showed progressive disease  Opdivo discontinued  Taxotere and Cyramza initiated  September 28, 2016-CT chest, abdomen, pelvis showed significant improvement in previously progressive disease  Taxotere and Cyramza continued  Current Therapy: May 20, 2016- CT showed progressive disease  Alimta and carboplatin discontinued  Opdivo 3 mg/kg IV every 2 weeks initiated  Interval History: Asept cath drainage has been diminishing, now about 6cc/wk  Review of Systems  Complete-Female:   Constitutional: No fever, no chills, feels well, no tiredness, no recent weight gain or weight loss and no recent weight loss  Eyes: No complaints of eye pain, no red eyes, no eyesight problems, no discharge, no dry eyes, no itching of eyes  ENT: no complaints of earache, no loss of hearing, no nose bleeds, no nasal discharge, no sore throat, no hoarseness  Cardiovascular: No complaints of slow heart rate, no fast heart rate, no chest pain, no palpitations, no leg claudication, no lower extremity edema     Respiratory: No complaints of shortness of breath, no wheezing, no cough, no SOB on exertion, no orthopnea, no PND  Gastrointestinal: 2013- colonoscopy-no polyps  , but No complaints of abdominal pain, no constipation, no nausea or vomiting, no diarrhea, no bloody stools  Genitourinary: No complaints of dysuria, no incontinence, no pelvic pain, no dysmenorrhea, no vaginal discharge or bleeding  Musculoskeletal: No complaints of arthralgias, no myalgias, no joint swelling or stiffness, no limb pain or swelling  Integumentary: No complaints of skin rash or lesions, no itching, no skin wounds, no breast pain or lump  Neurological: No complaints of headache, no confusion, no convulsions, no numbness, no dizziness or fainting, no tingling, no limb weakness, no difficulty walking  Psychiatric: Not suicidal, no sleep disturbance, no anxiety or depression, no change in personality, no emotional problems  Endocrine: No complaints of proptosis, no hot flashes, no muscle weakness, no deepening of the voice, no feelings of weakness  Hematologic/Lymphatic: No complaints of swollen glands, no swollen glands in the neck, does not bleed easily, does not bruise easily  Active Problems    1  Adenocarcinoma of right lung (162 9) (C34 91)   2  Anxiety (300 00) (F41 9)   3  Colon cancer screening (V76 51) (Z12 11)   4  Cough (786 2) (R05)   5  Encounter for routine gynecological examination (V72 31) (Z01 419)   6  Encounter for screening mammogram for malignant neoplasm of breast (V76 12)   (Z12 31)   7  Hyperlipidemia (272 4) (E78 5)   8  Liver metastases (197 7) (C78 7)   9  Lung mass (786 6) (R91 8)   10  Pleural effusion on right (511 9) (J90)   11  Pneumonia (486) (J18 9)   12  Post-menopausal osteoporosis (733 01) (M81 0)   13  Red eye (379 93) (H57 8)    Past Medical History    1  History of Encounter for screening mammogram for malignant neoplasm of breast   (V76 12) (Z12 31)   2  History of Pneumonia (486) (J18 9)   3   History of Sinusitis (473 9) (J32 9)    Surgical History    1  History of Salpingo-oophorectomy Bilateral   2  History of Total Abdominal Hysterectomy    Family History  Mother    1  Family history of Diabetes (250 00) (E11 9)   2  Family history of Hypertension (401 9) (I10)  Father    3  Family history of Asthma (493 90) (J45 909)  Brother    4  Family history of malignant neoplasm of stomach (V16 0) (Z80 0)  Family History    5  Denied: Family history of colon cancer   6  Denied: Family history of malignant neoplasm of breast   7  Denied: Family history of malignant neoplasm of uterus   8  Denied: Family history of osteoporosis   9  Denied: Family history of Ovarian ca    Social History    · Caffeine Use   · Denied: Drug use (305 90) (F19 90)   · Former smoker (D79 02) (Z87 891)   · No alcohol use   · Sun Protection Sunscreen   · Uses Safety Equipment - Seatbelts    Current Meds   1  B Complex CAPS; Therapy: (Recorded:18Aaa6699) to Recorded   2  Calcium + D TABS; Take as directed per package instructions Recorded   3  Lisinopril 2 5 MG Oral Tablet; TAKE 1 TABLET DAILY; Therapy: 73Wrx0057 to (Gay Fuentes)  Requested for: 48Bqb8462; Last   Rx:14Ofc7161 Ordered   4  LORazepam 0 5 MG Oral Tablet; TAKE 1 TABLET Every 8 hours; Therapy: 68SMM8031 to (Evaluate:26Tqb7821); Last Rx:14Xpn9397 Ordered   5  Vitamin D 2000 UNIT Oral Tablet Recorded   6  Vitamin E TABS; Therapy: (Recorded:49Kks7423) to Recorded    Allergies    1  Tetracyclines    2  Shellfish    Vitals  Vital Signs    Recorded: 20PLO0007 74:43UR   Systolic 277   Diastolic 72   Heart Rate 88   Respiration 18   Temperature 96 6 F   Pain Scale 0   O2 Saturation 98   Height 5 ft 2 5 in   Weight 112 lb 2 oz   BMI Calculated 20 18   BSA Calculated 1 5     Physical Exam    Constitutional   General appearance: No acute distress, well appearing and well nourished  Eyes   Conjunctiva and lids: No swelling, erythema or discharge      Ears, Nose, Mouth, and Throat   External inspection of ears and nose: Normal     Oropharynx: Normal with no erythema, edema, exudate or lesions  Pulmonary   Auscultation of lungs: Clear to auscultation  Cardiovascular   Auscultation of heart: Normal rate and rhythm, normal S1 and S2, without murmurs  Examination of extremities for edema and/or varicosities: Normal     Abdomen   Abdomen: Non-tender, no masses  Liver and spleen: No hepatomegaly or splenomegaly  Lymphatic   Palpation of lymph nodes in neck: No lymphadenopathy  Musculoskeletal   Gait and station: Normal     Skin   Skin and subcutaneous tissue: Normal without rashes or lesions  Neurologic   Cranial nerves: Cranial nerves 2-12 intact  Psychiatric   Orientation to person, place, and time: Normal          Health Management  Health Maintenance   Medicare Annual Wellness Visit; every 1 year; Last [id number];  Next Due: 98Pmr4879; Near Due    Signatures   Electronically signed by : TRINITY Valenzuela DOM D ,DO; Oct 21 2016 11:40AM EST                       (Author)

## 2018-01-15 NOTE — PROGRESS NOTES
Assessment    1  Adenocarcinoma of right lung (162 9) (C34 91)    Plan  Adenocarcinoma of right lung    · Drink plenty of fluids ; Status:Complete;   Done: 01ZOF0058   Ordered; For:Adenocarcinoma of right lung; Ordered By:Mitali Reis;   · (1) CBC/PLT/DIFF; Status:Complete;   Done: 74CBX8356 01:02PM   BXA:02AGC5596;ANAFBDG; For:Adenocarcinoma of right lung; Ordered By:Brett Reis;   · (1) CBC/PLT/DIFF; Status: In Progress - Order Generated; Requested for:Recurring  Schedule: 7/29/2016; 8/5/2016; 8/12/2016; 8/19/2016; 8/26/2016; 9/2/2016; 9/9/2016;  9/16/2   ; Perform:[facility]; LGP:09RFG7788;FNIHVWR; For:Adenocarcinoma of right lung; Ordered By:Mitali Reis;   · (1) COMPREHENSIVE METABOLIC PANEL; Status:Complete;   Done: 23FBQ2403  01:02PM   AAJ:95WHY2659;SXYCWCX; For:Adenocarcinoma of right lung; Ordered By:Brett Reis;   · (1) COMPREHENSIVE METABOLIC PANEL; Status: In Progress - Order Generated; Requested for:Recurring Schedule: 7/29/2016; 8/5/2016; 8/12/2016; 8/19/2016;  8/26/2016; 9/2/2016; 9/9/2016; 9/16/2   ; Perform:[facility]; JYL:76UBH7905;OXWVAJU; For:Adenocarcinoma of right lung; Ordered By:Mitali Reis;   · (1) PROTEIN, URINE RANDOM; Status:Active - Retrospective By Protocol Authorization; Requested for:83Ony1383;    Perform:[facility]; Due:86Pdx2070; Last Updated Alvarez Orellana; 7/29/2016 4:07:04 PM;Ordered; For:Adenocarcinoma of right lung; Ordered By:Mitali Reis;   · Follow-up visit in 3 weeks Evaluation and Treatment  Follow-up  Status: Complete  Done:  25MOR4168 11:45AM   Ordered; For: Adenocarcinoma of right lung; Ordered By: Kermit Freedman Performed:  Due: 62OTD6090; Last Updated By: Ryan Reed; 7/29/2016 12:56:10 PM    Discussion/Summary  Discussion Summary:   In summary, this is a 77-year-old female history of lung cancer as outlined    Biopsy showed recurrent/progressive lung cancer without evidence of heavy lymphoid infiltrate (pseudo-progression)  She was seen at [facility] about a week ago  They recommended further treatment with Taxotere and Cyramza (a monoclonal antibody against VEGF receptor)  We reviewed potential toxicities  This program including but not limited to, alopecia, cytopenias, nausea, vomiting, fluid retention, diarrhea, hypertension, proteinuria, and rare episodes of wound dehiscence  We reviewed prophylactic measures taken routinely as well as monitoring to allow for early intervention is appropriate  Our chemotherapy nurse review the above information as well as given her written information in this regard  My inclination would be to treat her at 79 mg/m^2 of Taxotere with Cyramza at standard dose  I reviewed the above considerations with the patient and her daughter  They voiced understanding and agreement  Understands and agrees with treatment plan: The treatment plan was reviewed with the patient/guardian  The patient/guardian understands and agrees with the treatment plan   Counseling Documentation With Imm: The patient, patient's family was counseled regarding diagnostic results, instructions for management, prognosis, patient and family education, impressions, risks and benefits of treatment options  total time of encounter was 40 minutes  Chief Complaint  Chief Complaint Free Text Note Form: followup regarding lung cancer  History of Present Illness  HPI: September 2015- started with a cough  Had small volume hemoptysis  Saw PCP  Got CXR which showed right middle lobe airspace disease, small, right pleural effusion  CT scan of the chest showed 4 1 cm right hilar mass with obstruction of the right middle lobe bronchus  Tumor extends along, right hilum encasing the right middle lobe pulmonary artery  Numerous pleural-based nodules are noted on the right  Borderline possibly necrotic adenopathy is noted in the internal mammary chain  Thickening of the left adrenal is noted     December 15, 2015- Bronchoscopy proved positive for adenocarcinoma, TTF-1 positive  January 2016 rash  Started Alimta 500 mg per meter squared, carbo AUC-5, both given, q  21 days  March 2016- CT showed mixed response  Right chest Pleurx catheter placed  Chemotherapy continued on schedule  May 20, 2016Ã¢â¬â CT showed progressive disease  Alimta and carboplatin discontinued  Opdivo 3 mg/kg IV every 2 weeks initiated  Current Therapy: May 20, 2016Ã¢â¬â CT showed progressive disease  Alimta and carboplatin discontinued  Opdivo 3 mg/kg IV every 2 weeks initiated  Review of Systems  Complete-Female:   Constitutional: No fever, no chills, feels well, no tiredness, no recent weight gain or weight loss and no recent weight loss  Eyes: No complaints of eye pain, no red eyes, no eyesight problems, no discharge, no dry eyes, no itching of eyes  ENT: no complaints of earache, no loss of hearing, no nose bleeds, no nasal discharge, no sore throat, no hoarseness  Cardiovascular: No complaints of slow heart rate, no fast heart rate, no chest pain, no palpitations, no leg claudication, no lower extremity edema  Respiratory: No complaints of shortness of breath, no wheezing, no cough, no SOB on exertion, no orthopnea, no PND  Gastrointestinal: 2013- colonoscopy-no polyps  , but No complaints of abdominal pain, no constipation, no nausea or vomiting, no diarrhea, no bloody stools  Genitourinary: No complaints of dysuria, no incontinence, no pelvic pain, no dysmenorrhea, no vaginal discharge or bleeding  Musculoskeletal: No complaints of arthralgias, no myalgias, no joint swelling or stiffness, no limb pain or swelling  Integumentary: No complaints of skin rash or lesions, no itching, no skin wounds, no breast pain or lump  Neurological: No complaints of headache, no confusion, no convulsions, no numbness, no dizziness or fainting, no tingling, no limb weakness, no difficulty walking     Psychiatric: Not suicidal, no sleep disturbance, no anxiety or depression, no change in personality, no emotional problems  Endocrine: No complaints of proptosis, no hot flashes, no muscle weakness, no deepening of the voice, no feelings of weakness  Hematologic/Lymphatic: No complaints of swollen glands, no swollen glands in the neck, does not bleed easily, does not bruise easily  Active Problems    1  Adenocarcinoma of right lung (162 9) (C34 91)   2  Anxiety (300 00) (F41 9)   3  Colon cancer screening (V76 51) (Z12 11)   4  Cough (786 2) (R05)   5  Encounter for routine gynecological examination (V72 31) (Z01 419)   6  Encounter for screening mammogram for malignant neoplasm of breast (V76 12)   (Z12 31)   7  Hyperlipidemia (272 4) (E78 5)   8  Lung mass (786 6) (R91 8)   9  Pleural effusion on right (511 9) (J90)   10  Pneumonia (486) (J18 9)   11  Post-menopausal osteoporosis (733 01) (M81 0)   12  Red eye (379 93) (H57 8)    Past Medical History    1  History of Encounter for screening mammogram for malignant neoplasm of breast   (V76 12) (Z12 31)   2  History of Pneumonia (486) (J18 9)   3  History of Sinusitis (473 9) (J32 9)    Surgical History    1  History of Salpingo-oophorectomy Bilateral   2  History of Total Abdominal Hysterectomy    Family History  Mother    1  Family history of Diabetes (250 00) (E11 9)   2  Family history of Hypertension (401 9) (I10)  Father    3  Family history of Asthma (493 90) (J45 909)  Brother    4  Family history of malignant neoplasm of stomach (V16 0) (Z80 0)  Family History    5  Denied: Family history of colon cancer   6  Denied: Family history of malignant neoplasm of breast   7  Denied: Family history of malignant neoplasm of uterus   8  Denied: Family history of osteoporosis   9   Denied: Family history of Ovarian ca    Social History    · Caffeine Use   · Denied: Drug use (305 90) (F19 90)   · Former smoker (T90 59) (D45 483)   · No alcohol use   · Sun Protection Sunscreen   · Uses Safety Equipment - Seatbelts    Current Meds   1  B Complex CAPS; Therapy: (Recorded:52Rav5835) to Recorded   2  Calcium + D TABS; Take as directed per package instructions Recorded   3  LORazepam 0 5 MG Oral Tablet; TAKE 1 TABLET Every 8 hours; Therapy: 60JOA9386 to (Evaluate:05Jun2016); Last Rx:00Vit3895 Ordered   4  Vitamin D 2000 UNIT Oral Tablet Recorded   5  Vitamin E TABS; Therapy: (Recorded:46Uum3380) to Recorded    Allergies    1  Tetracyclines    2  Shellfish    Vitals  Vital Signs    Recorded: 77GWR7065 87:00FL   Systolic 565   Diastolic 76   Heart Rate 86   Respiration 18   Temperature 97 F   Pain Scale 0   O2 Saturation 96   Height 5 ft 2 5 in   Weight 110 lb    BMI Calculated 19 8   BSA Calculated 1 49     Physical Exam    Constitutional   General appearance: No acute distress, well appearing and well nourished  Eyes   Conjunctiva and lids: No swelling, erythema or discharge  Ears, Nose, Mouth, and Throat   External inspection of ears and nose: Normal     Oropharynx: Normal with no erythema, edema, exudate or lesions  Pulmonary   Auscultation of lungs: Clear to auscultation  Cardiovascular   Auscultation of heart: Normal rate and rhythm, normal S1 and S2, without murmurs  Examination of extremities for edema and/or varicosities: Normal     Abdomen   Abdomen: Non-tender, no masses  Liver and spleen: No hepatomegaly or splenomegaly  Lymphatic   Palpation of lymph nodes in neck: No lymphadenopathy  Musculoskeletal   Gait and station: Normal     Skin   Skin and subcutaneous tissue: Normal without rashes or lesions  Neurologic   Cranial nerves: Cranial nerves 2-12 intact      Psychiatric   Orientation to person, place, and time: Normal          Results/Data  (1) COMPREHENSIVE METABOLIC PANEL 03PGY3910 56:93BF Bernie Kelly Order Number: [id number]     Test Name Result Flag Reference   GLUCOSE,RANDM 115 mg/dL     If the patient is fasting, the ADA then defines impaired fasting glucose as > 100 mg/dL and diabetes as > or equal to 123 mg/dL  SODIUM 141 mmol/L  136-145   POTASSIUM 3 8 mmol/L  3 5-5 3   CHLORIDE 101 mmol/L  100-108   CARBON DIOXIDE 30 mmol/L  21-32   ANION GAP (CALC) 10 mmol/L  4-13   BLOOD UREA NITROGEN 13 mg/dL  5-25   CREATININE 0 58 mg/dL L 0 60-1 30   Standardized to IDMS reference method   CALCIUM 8 5 mg/dL  8 3-10 1   BILI, TOTAL 0 30 mg/dL  0 20-1 00   ALK PHOSPHATAS 85 U/L     ALT (SGPT) 41 U/L  12-78   AST(SGOT) 43 U/L  5-45   ALBUMIN 3 0 g/dL L 3 5-5 0   TOTAL PROTEIN 7 7 g/dL  6 4-8 2   eGFR Non-African American      >60 0 ml/min/1 73sq m   [facility] Disease Education Program recommendations are as follows:  GFR calculation is accurate only with a steady state creatinine  Chronic Kidney disease less than 60 ml/min/1 73 sq  meters  Kidney failure less than 15 ml/min/1 73 sq  meters  (1) CBC/PLT/DIFF 62Ctp0885 01:02PM Halima Angel Order Number: [id number]     Test Name Result Flag Reference   WBC COUNT 4 58 Thousand/uL  4 31-10 16   RBC COUNT 3 98 Million/uL  3 81-5 12   HEMOGLOBIN 11 6 g/dL  11 5-15 4   HEMATOCRIT 36 9 %  34 8-46  1   MCV 93 fL  82-98   MCH 29 1 pg  26 8-34 3   MCHC 31 4 g/dL  31 4-37 4   RDW 12 0 %  11 6-15 1   MPV 8 5 fL L 8 9-12 7   PLATELET COUNT 574 Thousands/uL  149-390   NEUTROPHILS RELATIVE PERCENT 65 %  43-75   LYMPHOCYTES RELATIVE PERCENT 19 %  14-44   MONOCYTES RELATIVE PERCENT 14 % H 4-12   EOSINOPHILS RELATIVE PERCENT 2 %  0-6   BASOPHILS RELATIVE PERCENT 0 %  0-1   NEUTROPHILS ABSOLUTE COUNT 2 98 Thousands/?L  1 85-7 62   LYMPHOCYTES ABSOLUTE COUNT 0 88 Thousands/?L  0 60-4 47   MONOCYTES ABSOLUTE COUNT 0 63 Thousand/?L  0 17-1 22   EOSINOPHILS ABSOLUTE COUNT 0 07 Thousand/?L  0 00-0 61   BASOPHILS ABSOLUTE COUNT 0 02 Thousands/?L  0 00-0 10   - Patient Instructions: This bloodwork is non-fasting  Please drink two glasses of water morning of bloodwork      - Patient Instructions: This bloodwork is non-fasting  Please drink two glasses of water morning of bloodwork  Health Management  Health Maintenance   Medicare Annual Wellness Visit; every 1 year; Last 01CNJ9997; Next Due: [id number];  Active    Future Appointments    Date/Time Provider Specialty Site   08/19/2016 11:45 AM TRINITY Haji , DO, [facility]     Signatures   Electronically signed by : TRINITY Aleman D ,DO; Jul 29 2016  4:56PM EST                       (Author)

## 2018-01-16 NOTE — PROGRESS NOTES
Assessment    1  Adenocarcinoma of right lung (162 9) (C34 91)    Plan  Adenocarcinoma of right lung    · Drink plenty of fluids ; Status:Complete;   Done: 10WPA1891   Ordered; For:Adenocarcinoma of right lung; Ordered By:Norm Reis;   · Follow-up visit in 1 month Evaluation and Treatment  Follow-up  Status: Hold For -  Scheduling  Requested for: [id number]   Ordered; For: Adenocarcinoma of right lung; Ordered By: Alex García Performed:  Due: 20WLJ1225   · * CT CHEST ABDOMEN PELVIS W CONTRAST; Status:Hold For - Scheduling; Requested  for:94Ezz5916;    Perform:[facility]; OKD:60HOI6441;JPSJZBK; For:Adenocarcinoma of right lung; Ordered By:Norm Reis;    Discussion/Summary  Discussion Summary:   In summary, this is a 82-year-old female history of adenocarcinoma the lung as outlined  She had her first treatment with Opdivo  She tolerated this well  The second was complicated by a right hers  She had no fever or cardia dynamic changes  This resolved fairly quickly  Etiology is uncertain  Infusion reactions are fairly uncommon with Opdivo  Otherwise she is feeling fairly well  Chest catheter was recently removed without incident  We will proceed with Opdivo's treatment #3 and 4  Premedication with Benadryl and Demerol is arranged  Investigation into utility of hydrocortisone is underway  I reviewed the above with the patient and her daughter  They voiced understanding and agreement  We will see her back in one month with reimaging just prior to that visit  She voiced understanding and agreement  Understands and agrees with treatment plan: The treatment plan was reviewed with the patient/guardian  The patient/guardian understands and agrees with the treatment plan   Counseling Documentation With Imm: The patient was counseled regarding diagnostic results, instructions for management, patient and family education, impressions  total time of encounter was 25 minutes        History of Present Illness  HPI: September 2015- started with a cough  Had small volume hemoptysis  Saw PCP  Got CXR which showed right middle lobe airspace disease, small, right pleural effusion  CT scan of the chest showed 4 1 cm right hilar mass with obstruction of the right middle lobe bronchus  Tumor extends along, right hilum encasing the right middle lobe pulmonary artery  Numerous pleural-based nodules are noted on the right  Borderline possibly necrotic adenopathy is noted in the internal mammary chain  Thickening of the left adrenal is noted  December 15, 2015- Bronchoscopy proved positive for adenocarcinoma, TTF-1 positive  January 2016 rash  Started Alimta 500 mg per meter squared, carbo AUC-5, both given, q  21 days  March 2016- CT showed mixed response  Right chest Pleurx catheter placed  Chemotherapy continued on schedule  May 20, 2016- CT showed progressive disease  Alimta and carboplatin discontinued  Opdivo 3 mg/kg IV every 2 weeks initiated  Current Therapy: May 20, 2016- CT showed progressive disease  Alimta and carboplatin discontinued  Opdivo 3 mg/kg IV every 2 weeks initiated  Interval History: Asept cath drainage has been diminishing, now about 6cc/wk  Review of Systems  Complete-Female:   Constitutional: No fever, no chills, feels well, no tiredness, no recent weight gain or weight loss and no recent weight loss  Eyes: No complaints of eye pain, no red eyes, no eyesight problems, no discharge, no dry eyes, no itching of eyes  ENT: no complaints of earache, no loss of hearing, no nose bleeds, no nasal discharge, no sore throat, no hoarseness  Cardiovascular: No complaints of slow heart rate, no fast heart rate, no chest pain, no palpitations, no leg claudication, no lower extremity edema  Respiratory: No complaints of shortness of breath, no wheezing, no cough, no SOB on exertion, no orthopnea, no PND  Gastrointestinal: 2013- colonoscopy-no polyps  , but No complaints of abdominal pain, no constipation, no nausea or vomiting, no diarrhea, no bloody stools  Genitourinary: No complaints of dysuria, no incontinence, no pelvic pain, no dysmenorrhea, no vaginal discharge or bleeding  Musculoskeletal: No complaints of arthralgias, no myalgias, no joint swelling or stiffness, no limb pain or swelling  Integumentary: No complaints of skin rash or lesions, no itching, no skin wounds, no breast pain or lump  Neurological: No complaints of headache, no confusion, no convulsions, no numbness, no dizziness or fainting, no tingling, no limb weakness, no difficulty walking  Psychiatric: Not suicidal, no sleep disturbance, no anxiety or depression, no change in personality, no emotional problems  Endocrine: No complaints of proptosis, no hot flashes, no muscle weakness, no deepening of the voice, no feelings of weakness  Hematologic/Lymphatic: No complaints of swollen glands, no swollen glands in the neck, does not bleed easily, does not bruise easily  Active Problems    1  Adenocarcinoma of right lung (162 9) (C34 91)   2  Anxiety (300 00) (F41 9)   3  Colon cancer screening (V76 51) (Z12 11)   4  Cough (786 2) (R05)   5  Hyperlipidemia (272 4) (E78 5)   6  Lung mass (786 6) (R91 8)   7  Pleural effusion on right (511 9) (J90)   8  Pneumonia (486) (J18 9)   9  Post-menopausal osteoporosis (733 01) (M81 0)   10  Red eye (379 93) (H57 8)    Past Medical History    1  History of Encounter for routine gynecological examination (V72 31) (Z01 419)   2  History of Encounter for screening mammogram for malignant neoplasm of breast   (V76 12) (Z12 31)   3  History of Pneumonia (486) (J18 9)   4  History of Sinusitis (473 9) (J32 9)    Surgical History    1  History of Salpingo-oophorectomy Bilateral   2  History of Total Abdominal Hysterectomy    Family History  Mother    1  Family history of Diabetes (250 00) (E11 9)   2  Family history of Hypertension (401 9) (I10)  Father    3   Family history of Asthma (493 90) (J45 909)  Brother    4  Family history of malignant neoplasm of stomach (V16 0) (Z80 0)  Family History    5  Denied: Family history of colon cancer   6  Denied: Family history of malignant neoplasm of breast   7  Denied: Family history of malignant neoplasm of uterus   8  Denied: Family history of osteoporosis   9  Denied: Family history of Ovarian ca    Social History    · Caffeine Use   · Denied: Drug use (305 90) (F19 90)   · Former smoker (B17 43) (Z87 891)   · No alcohol use   · Sun Protection Sunscreen   · Uses Safety Equipment - Seatbelts    Current Meds   1  B Complex CAPS; Therapy: (Recorded:77Kbv6218) to Recorded   2  Calcium + D TABS; Take as directed per package instructions Recorded   3  LORazepam 0 5 MG Oral Tablet; TAKE 1 TABLET Every 8 hours; Therapy: 17YJM8677 to (Evaluate:05Jun2016); Last Rx:17Zgs8236 Ordered   4  Prochlorperazine Maleate 10 MG Oral Tablet; TAKE 1 TABLET EVERY 6 HOURS AS   NEEDED FOR NAUSEA; Therapy: 57NBS4674 to (Evaluate:66Ucv4128)  Requested for: 11Lxv7496; Last   Rx:88Wxl0492 Ordered   5  Vitamin D 2000 UNIT Oral Tablet Recorded    Allergies    1  Tetracyclines    2  Shellfish    Vitals  Vital Signs [Data Includes: Current Encounter]    Recorded: [id number] 01:18PM   Temperature 97 8 F   Heart Rate 80   Respiration 18   Systolic 350   Diastolic 80   Height 5 ft 2 5 in   Weight 112 lb 4 oz   BMI Calculated 20 2   BSA Calculated 1 5   O2 Saturation 95     Physical Exam    Constitutional   General appearance: No acute distress, well appearing and well nourished  Eyes   Conjunctiva and lids: No swelling, erythema or discharge  Ears, Nose, Mouth, and Throat   External inspection of ears and nose: Normal     Oropharynx: Normal with no erythema, edema, exudate or lesions  Pulmonary   Auscultation of lungs: Clear to auscultation  Cardiovascular   Auscultation of heart: Normal rate and rhythm, normal S1 and S2, without murmurs      Examination of extremities for edema and/or varicosities: Normal     Abdomen   Abdomen: Non-tender, no masses  Liver and spleen: No hepatomegaly or splenomegaly  Lymphatic   Palpation of lymph nodes in neck: No lymphadenopathy  Musculoskeletal   Gait and station: Normal     Skin   Skin and subcutaneous tissue: Normal without rashes or lesions  Neurologic   Cranial nerves: Cranial nerves 2-12 intact  Psychiatric   Orientation to person, place, and time: Normal          Health Management  Health Maintenance   Medicare Annual Wellness Visit; every 1 year; Last 86ASO8468; Next Due: [id number];  Active    Future Appointments    Date/Time Provider Specialty Site   06/20/2016 09:30 AM Shiloh Chinchilla MD Obstetrics/Gynecology [address]   Electronically signed by : TRINITY Pelletier DOM D ,DO; Jun 17 2016  1:51PM EST                       (Author)

## 2018-01-16 NOTE — MISCELLANEOUS
Message   Recorded as Task   Date: 06/06/2017 12:52 PM, Created By: Bautista Holguin   Task Name: Call Back   Assigned To: Lizbeth Horton   Regarding Patient: Shanice Lopez, Status: Active   Seanisidoro Smith - 06 Jun 2017 12:52 PM     TASK CREATED  Patient called with blood pressures May 31 p66 bp117/65                                                June 02 p68 bp114/64                                                June 04 p 66 bp117/68        Active Problems    1  Acute pericarditis (420 90) (I30 9)   2  Adenocarcinoma of right lung (162 9) (C34 91)   3  Advance directive discussed with patient (V65 49) (Z71 89)   4  Anxiety (300 00) (F41 9)   5  Benign essential hypertension (401 1) (I10)   6  Colon cancer screening (V76 51) (Z12 11)   7  Encounter for routine gynecological examination (V72 31) (Z01 419)   8  Encounter for screening mammogram for malignant neoplasm of breast (V76 12)   (Z12 31)   9  Hyperlipidemia (272 4) (E78 5)   10  Hypertension (401 9) (I10)   11  Liver metastases (197 7) (C78 7)   12  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)   13  Metastasis to brain (198 3) (C79 31)   14  Open wound of chest wall (875 0) (S21 109A)   15  Pleural effusion on right (511 9) (J90)   16  Pneumonia (486) (J18 9)   17  Poor appetite (783 0) (R63 0)   18  Port-a-cath in place (V45 89) (K69 814)   19  Post-menopausal osteoporosis (733 01) (M81 0)   20  Postoperative visit (V58 49) (Z48 89)   21  Refused influenza vaccine (V64 06) (Z28 21)   22  Shortness of breath (786 05) (R06 02)    Current Meds   1  B Complex CAPS; Therapy: (Recorded:84Seq3254) to Recorded   2  Calcium + D TABS; Take as directed per package instructions Recorded   3  Lisinopril 20 MG Oral Tablet; Take 1 tablet by mouth  twice a day; Therapy: 49LTZ8607 to (Evaluate:38Qeh9112)  Requested for: 51UGX2418; Last   Rx:05Apr2017 Ordered   4   Metoprolol Tartrate 50 MG Oral Tablet; TAKE 1 TABLET EVERY 12 HOURS DAILY    Requested for: 54OBW7883; Last Rx:22Mar2017 Ordered   5  Vitamin D 2000 UNIT Oral Tablet Recorded   6  Vitamin E TABS; Therapy: (Recorded:30Wqt9703) to Recorded    Allergies    1  Tetracyclines    2   Shellfish    Signatures   Electronically signed by : Taras Herbert, ; Jun 6 2017 12:54PM EST                       (Author)

## 2018-01-17 NOTE — PROCEDURES
Procedures by Geraldine Bardales DO at 8/5/2017 12:48 PM      Author:  Geraldine Bardales DO Service:  Critical Care/ICU Author Type:  Physician    Filed:  8/5/2017 12:50 PM Date of Service:  8/5/2017 12:48 PM Status:  Signed    :  Geraldine Bardales DO (Physician)        Pre-procedure Diagnoses:       1  Cardiac tamponade [I31 4]                Post-procedure Diagnoses:       1  Cardiac tamponade [I31 4]                 Pericardiocentesis  Date/Time: 8/5/2017 12:44 PM  Performed by: Kim Mccloud  Authorized by: Kim Mccloud     Patient location:  ICU  Other Assisting Provider: Yes (comment)    Consent:     Consent obtained:  Emergent situation  Indications:     Procedure Purpose: therapeutic      Indications: other (comment)      Indications comment:  Pericardial effusion wiht tamponade  Sedation:     Sedation type: Anxiolysis (None)  Anesthesia (see MAR for exact dosages): Anesthesia method:  None  Procedure details:     Preparation: Patient was prepped and draped in usual sterile fashion      Patient position:  Supine    Puncture method:  Needle only    Ultrasound guidance: yes      Indwelling catheter placed: no      Number of attempts:  2    Needle gauge:  16  Post-procedure details:     Chest x-ray performed: yes      Patient tolerance of procedure: Tolerated well, no immediate complications  Comments:     Pericardiocentesis performed for cardiac tamponade with shock and multiorgan failure  Removal of approx 80 cc bloody fluid  Dina and post procedure US shows improvement in volume of effusion and cardiac function with expansion of RV             Received for:Provider  EPIC   Aug  5 2017 12:51PM [facility] Standard Time

## 2018-01-17 NOTE — MISCELLANEOUS
Message   Recorded as Task   Date: 03/07/2017 04:12 PM, Created By: Jerman Chin   Task Name: Care Coordination   Assigned To: Lizbeth Horton   Regarding Patient: Shanice Lopez, Status: Active   CommentJolyn Kelly - 07 Mar 2017 4:12 PM     TASK CREATED  Patient was approved for free Iressa  She will be getting her 1st shipment in 3-5 business days  I explained to the patient that she needs to call them when she has 7-10 days of pills left to schedule shipment, they will not call her  I told her she would need to call them [phone] option 1  Enrolled until March of 2018        Active Problems    1  Acute pericarditis (420 90) (I30 9)   2  Adenocarcinoma of right lung (162 9) (C34 91)   3  Advance directive discussed with patient (V65 49) (Z71 89)   4  Anxiety (300 00) (F41 9)   5  Benign essential hypertension (401 1) (I10)   6  Colon cancer screening (V76 51) (Z12 11)   7  Cough (786 2) (R05)   8  Encounter for routine gynecological examination (V72 31) (Z01 419)   9  Encounter for screening mammogram for malignant neoplasm of breast (V76 12)   (Z12 31)   10  Hyperlipidemia (272 4) (E78 5)   11  Hypertension (401 9) (I10)   12  Liver metastases (197 7) (C78 7)   13  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)   14  Metastasis to brain (198 3) (C79 31)   15  Pleural effusion on right (511 9) (J90)   16  Pneumonia (486) (J18 9)   17  Poor appetite (783 0) (R63 0)   18  Post-menopausal osteoporosis (733 01) (M81 0)   19  Refused influenza vaccine (V64 06) (Z28 21)   20  Shortness of breath (786 05) (R06 02)    Current Meds   1  B Complex CAPS; Therapy: (Recorded:55Lil7117) to Recorded   2  Calcium + D TABS; Take as directed per package instructions Recorded   3  Decadron TABS; 1 tab twice daily; Therapy: (Recorded:33Qlr7305) to Recorded   4  Keppra TABS (LevETIRAcetam); TAKE 1 TABLET TWICE DAILY; Therapy: (Recorded:07Ubl5014) to Recorded   5   Lidocaine-Prilocaine 2 5-2 5 % External Cream; APPLY 1 HOUR BEFORE PROCEDURE   AS DIRECTED; Therapy: 03QYA4990 to (Evaluate:32Xyf4097)  Requested for: 15WAZ4060; Last   Rx:11Nov2016 Ordered   6  Lisinopril 20 MG Oral Tablet; Take 1 tablet twice daily  Requested for: 25Cdy0695; Last   Rx:36Whj0155 Ordered   7  Metoprolol Tartrate 50 MG Oral Tablet; TAKE 1 TABLET EVERY 12 HOURS DAILY; Therapy: (Recorded:91Kek7328) to Recorded   8  Vitamin D 2000 UNIT Oral Tablet Recorded   9  Vitamin E TABS; Therapy: (Recorded:33Smu7846) to Recorded    Allergies    1  Tetracyclines    2   Shellfish    Signatures   Electronically signed by : Lauren Sharpe, ; Mar  8 2017  8:10AM EST                       (Author)

## 2018-01-22 VITALS — SYSTOLIC BLOOD PRESSURE: 140 MMHG | DIASTOLIC BLOOD PRESSURE: 78 MMHG

## 2018-01-22 VITALS
WEIGHT: 105.5 LBS | BODY MASS INDEX: 18.69 KG/M2 | OXYGEN SATURATION: 95 % | HEART RATE: 117 BPM | HEIGHT: 63 IN | TEMPERATURE: 96.9 F

## 2018-01-22 VITALS
BODY MASS INDEX: 18.27 KG/M2 | TEMPERATURE: 96.9 F | HEIGHT: 63 IN | HEART RATE: 82 BPM | WEIGHT: 103.13 LBS | RESPIRATION RATE: 18 BRPM | OXYGEN SATURATION: 93 %

## 2018-01-22 VITALS — TEMPERATURE: 96.1 F | HEIGHT: 63 IN | WEIGHT: 106 LBS | BODY MASS INDEX: 18.78 KG/M2

## 2018-01-22 VITALS — DIASTOLIC BLOOD PRESSURE: 72 MMHG | SYSTOLIC BLOOD PRESSURE: 122 MMHG

## 2018-01-24 NOTE — MISCELLANEOUS
Assessment   1  Adenocarcinoma of right lung (162 9) (C34 91)1   2  Metastasis to brain (198 3) (C79 31)1   3  Benign essential hypertension (401 1) (I10)1   4  Anxiety (300 00) (F41 9)1      1 Amended By: Amber Oconnell; Feb 27 2017 12:24 PM EST    Discussion/Summary  Discussion Summary:   Pt daughter now off from work  Plan 2 weeks radiation then oral chemo agent  Increase protein,fluids  Fall precautions,use cane for safety  Has oncology appt upcoming  Fall precautions,mentioned life alert but pt will not be alone now  Rto as scheduled in March 2    Goals and Barriers: The patient has the current Goals:2  Be comfortable2  The patent has the current Barriers:2  Recent further disease progression2    Patient's Capacity to Self-Care: Patient is able to LOLIS FISHER [facility] [place] [facility] [place]    Medication SE Review and Pt Understands Tx: The treatment plan was reviewed with the patient/guardian  The patient/guardian understands and agrees with the treatment plan2    Self Referrals:   Self Referrals: No1        1 Amended By: Laura Serrato; Feb 27 2017 8:38 AM EST   2 Amended By: Amber Oconnell; Feb 27 2017 8:33 PM EST    Chief Complaint  Chief Complaint Free Text Note Form: Pt presents for hosp f/up exam with her daughter  Pt states she was in the hosp recently for headache, nausea and inability to ambulate  Pt states she was told she has "brain mets"  Appetite is unchanged  No refills needed today  1        1 Amended By: Laura Serrato; Feb 27 2017 8:37 AM EST    History of Present Illness  TCM Communication [facility]: The patient is being contacted for follow-up after hospitalization  She was hospitalized at [facility]  The dates of hospitalization: February 16, 2017 thru February 19, 2017  Diagnosis: headache, difficulty ambulating,  SAH, brain mets1   She was discharged to home  She scheduled a follow up appointment  Follow-up appointments with other specialists: Dr Almaz Hernandez     Communication performed and completed by   HPI: Pt hospitalized after unable to ambulate  Ct scan suggested bleed and went to [facility]  Pt had mri which suggested brain mets  She has started radiation for planned 2 weeks  She seems to be tolerating and is on decadron and keppra  No falls  No headache or vision change  Daughter now home on fmla with her Mom  1        1 Amended By: Jhonathan Mclaughlin; Feb 27 2017 8:29 PM EST    Review of Systems  Complete-Female:   Constitutional:1  feeling tired1 , but no fever1  and no chills1   Eyes:1  No complaints of eye pain, no red eyes, no eyesight problems, no discharge, no dry eyes, no itching of eyes1  and no eyesight problems1   ENT:1  no complaints of earache, no loss of hearing, no nose bleeds, no nasal discharge, no sore throat, no hoarseness1 , no earache1  and no hearing loss1   Cardiovascular:1  No complaints of slow heart rate, no fast heart rate, no chest pain, no palpitations, no leg claudication, no lower extremity edema1   Respiratory:1  shortness of breath during exertion1 , but no cough1   Gastrointestinal:1  No complaints of abdominal pain, no constipation, no nausea or vomiting, no diarrhea, no bloody stools1  and no constipation1   Genitourinary:1  No complaints of dysuria, no incontinence, no pelvic pain, no dysmenorrhea, no vaginal discharge or bleeding1   Musculoskeletal:1  arthralgias1  and joint stiffness1   Integumentary:1  No complaints of skin rash or lesions, no itching, no skin wounds, no breast pain or lump1   Neurological:1  difficulty walking1 , but no headache1 , no confusion1 , no dizziness1  and no convulsions1   Psychiatric:1  anxiety1 , but no sleep disturbances1   Endocrine:1  No complaints of proptosis, no hot flashes, no muscle weakness, no deepening of the voice, no feelings of weakness1      Hematologic/Lymphatic:1  No complaints of swollen glands, no swollen glands in the neck, does not bleed easily, does not bruise easily1         1 Amended By: Suraj Chauhanut; Feb 27 2017 8:29 PM EST    Active Problems   1  Acute pericarditis (420 90) (I30 9)  2  Adenocarcinoma of right lung (162 9) (C34 91)  3  Advance directive discussed with patient (V65 49) (Z71 89)  4  Anxiety (300 00) (F41 9)  5  Benign essential hypertension (401 1) (I10)  6  Colon cancer screening (V76 51) (Z12 11)  7  Cough (786 2) (R05)  8  Encounter for routine gynecological examination (V72 31) (Z01 419)  9  Encounter for screening mammogram for malignant neoplasm of breast (V76 12)   (Z12 31)  10  Hyperlipidemia (272 4) (E78 5)  11  Hypertension (401 9) (I10)  12  Liver metastases (197 7) (C78 7)  13  Lung mass (786 6) (R91 8)  14  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)  15  Pericardial effusion (423 9) (I31 3)  16  Pleural effusion on right (511 9) (J90)  17  Pneumonia (486) (J18 9)  18  Poor appetite (783 0) (R63 0)  19  Post-menopausal osteoporosis (733 01) (M81 0)  20  Refused influenza vaccine (V64 06) (Z28 21)  21  Shortness of breath (786 05) (R06 02)    Past Medical History   1  History of Encounter for screening mammogram for malignant neoplasm of breast   (V76 12) (Z12 31)  2  History of Pneumonia (486) (J18 9)  3  History of Red eye (379 93) (H57 8)  4  History of Sinusitis (473 9) (J32 9)    Surgical History   1  History of Salpingo-oophorectomy Bilateral  2  History of Total Abdominal Hysterectomy  Surgical History Reviewed: The surgical history was reviewed and updated today  1        1 Amended By: Amy Sigala; Feb 27 2017 8:37 AM EST    Family History  Mother   1  Family history of Diabetes (250 00) (E11 9)  2  Family history of Hypertension  Father   3  Family history of Asthma (493 90) (J45 909)  Brother   4  Family history of malignant neoplasm of stomach (V16 0) (Z80 0)  Family History   5  Denied: Family history of colon cancer  6  Denied: Family history of malignant neoplasm of breast  7  Denied: Family history of malignant neoplasm of uterus  8   Denied: Family history of osteoporosis  9  Denied: Family history of Ovarian ca  Family History Reviewed: The family history was reviewed and updated today  1        1 Amended By: Laura Serrato; Feb 27 2017 8:37 AM EST    Social History    · Caffeine Use   · Dental care, occasionally   · Denied: Drug use (305 90) (F19 90)   · Former smoker (V15 82) (Z87 891)   · Lives independently with spouse   · No alcohol use   · Sun Protection Sunscreen   · Uses Safety Equipment - Seatbelts    Social History Reviewed: The social history was reviewed and updated today  1  The social history was reviewed and is unchanged  1        1 Amended By: Laura Serrato; Feb 27 2017 8:37 AM EST    Current Meds  1  B Complex CAPS; Therapy: (Recorded:93Mwm5665) to Recorded  2  Calcium + D TABS; Take as directed per package instructions Recorded  3  Lidocaine-Prilocaine 2 5-2 5 % External Cream; APPLY 1 HOUR BEFORE PROCEDURE   AS DIRECTED; Therapy: 86YNA6726 to (Evaluate:04Rwe9193)  Requested for: 67SAH7911; Last   Rx:12Zpt8656 Ordered  4  Lisinopril 20 MG Oral Tablet; TAKE ONE TAB IN AM AND 1/2 TAB IN PM;   Therapy: (Recorded:13Jwt3704) to Recorded  5  Metoprolol Tartrate 25 MG Oral Tablet; TAKE 1 TABLET TWICE DAILY  Requested for:   65LAO7541; Last HW:35IDO5133 Ordered  6  Mirtazapine 15 MG Oral Tablet; TAKE 1 TABLET AT BEDTIME; Therapy: 29TLW2153 to (Evaluate:45Wvk1835)  Requested for: 95TEA6933; Last   Rx:71Slr8875 Ordered  7  Vitamin D 2000 UNIT Oral Tablet Recorded  8  Vitamin E TABS; Therapy: (Recorded:35Cir5731) to Recorded  Medication List Reviewed: The medication list was reviewed and updated today  1        1 Amended By: Laura Serrato; Feb 27 2017 8:38 AM EST    Allergies   1  Tetracyclines   2   Shellfish    Vitals  Signs   Recorded: 27Feb2017 13:02VY    Systolic: 754 1    Diastolic: 72 1   Recorded: 27Feb2017 08:31AM    Temperature: 96 9 F 1    Heart Rate: 82 1    Respiration: 18 1    Height: 5 ft 2 5 in 1    Weight: 103 lb 2 08 oz 1    BMI Calculated: 18 56 1    BSA Calculated: 1 45 1    O2 Saturation: 93 1      1 Amended By: Kobi Saul; Feb 27 2017 8:29 PM EST    Physical Exam    Constitutional1    General appearance: Abnormal  1  Fatigued,frail but aao1   Eyes1    Conjunctiva and lids: No swelling, erythema or discharge  1    Pupils and irises: Equal, round and reactive to light  1    Ears, Nose, Mouth, and Throat1    External inspection of ears and nose: Normal 1    Otoscopic examination: Tympanic membranes translucent with normal light reflex  Canals patent without erythema  1    Nasal mucosa, septum, and turbinates: Normal without edema or erythema  1    Oropharynx: Normal with no erythema, edema, exudate or lesions  1    Pulmonary1    Respiratory effort: No increased work of breathing or signs of respiratory distress  1    Auscultation of lungs: Abnormal  1  Decrease bs1   Cardiovascular1    Palpation of heart: Normal PMI, no thrills  1    Auscultation of heart: Normal rate and rhythm, normal S1 and S2, without murmurs  1    Examination of extremities for edema and/or varicosities: Normal 1    Carotid pulses: Normal 1    Abdomen1    Abdomen: Non-tender, no masses  1    Liver and spleen: No hepatomegaly or splenomegaly  1    Lymphatic1    Palpation of lymph nodes in neck: No lymphadenopathy  1    Musculoskeletal   Gait and station: Abnormal  1  Hesitant at times1   Digits and nails: Normal without clubbing or cyanosis  1    Inspection/palpation of joints, bones, and muscles: Normal 1    Skin1    Skin and subcutaneous tissue: Normal without rashes or lesions  1    Neurologic1    Cranial nerves: Cranial nerves 2-12 intact  1    Reflexes: 2+ and symmetric  1    Sensation: No sensory loss  1    Psychiatric1    Orientation to person, place, and time: Normal 1    Mood and affect: Normal 1          1 Amended By: Kobi Saul; Feb 27 2017 8:31 PM EST    Health Management  Health Maintenance   Medicare Annual Wellness Visit; every 1 year; Last 26DQG4603; Next Due: 78Ezd4026;  Active    Future Appointments    Date/Time Provider Specialty Site   02/24/2017 09:00 AM TRINITY Ratliff , DO, [facility] Hematology Oncology [facility] [place]   02/27/2017 08:45 AM Luciano Patterson DO Internal Medicine [facility] INTERNAL MEDICINE [address]   04/05/2017 10:45 AM Luciano Patterson DO Internal Medicine [facility] INTERNAL MEDICINE [address]   02/23/2017 08:30 AM Cole Buchanan DO Cardiology [address]     Signatures  mca    Electronically signed by : Theresa Barone DO; Feb 22 2017 11:23AM EST                       (Author)    Electronically signed by : Theresa Barone DO; Feb 27 2017  8:33PM EST                       (Author)

## 2018-01-24 NOTE — PROGRESS NOTES
Assessment   1  Encounter for preventive health examination (V70 0) (Z00 00)    Plan  Adenocarcinoma of right lung, Anxiety, Pericardial effusion    · Follow-up visit in 6 weeks Evaluation and Treatment  Follow-up  Status: Hold For -  Scheduling  Requested for: 18FDR8360    Discussion/Summary    Pt having ct next week and has cardio,onCology appt next week  Increase fluids  Rto 6 weeks  Continue present bp rx 1      Impression: Subsequent Annual Wellness Visit, with preventive exam as well as age and risk appropriate counseling completed1   Cardiovascular screening and counselin  screening is current1  and counseling was given on ways to improve blood pressure1   Diabetes screening and counselin  screening is current1  and counseling was given on maintaining a healthy diet1   Colorectal cancer screening and counselin  screening is current1  and counseling was given on ways to eat a high fiber diet1   Breast cancer screening and counselin  screening is current1   Cervical cancer screening and counselin  screening is current1   Osteoporosis screening and counselin  screening not indicated1  and counseling was given on obtaining adequate amounts of calcium and vitamin D on a daily basis1   Abdominal aortic aneurysm screening and counseling: screening not indicated1   Glaucoma screening and counselin  screening is current1   HIV screening and counselin  screening not indicated1   Immunizations:1  The patient declines the influenza vaccination1 , influenza vaccination is recommended annually1 , the lifetime pneumococcal vaccine has been completed1 , hepatitis B vaccination series is not indicated at this time due to the patient's low risk of marco the disease1 , the patient declines the Zostavax vaccine1 , Td vaccination status is unknown1  and Tdap vaccination up to date1      Advance Directive Plannin  not complete1 , paperwork and instructions were given to the patient1   Advice and education were given regarding1  nutrition (non-diabetic)1   Patient Discussion:1  plan discussed with the patient1 , follow-up visit needed in 6 weeks1   The patient has the current Goals:1  Cancer sxs managed,comfort1  The patent has the current Barriers:1  Recent pericarditis,lung cancer1        Patient is able to Self-Care  The treatment plan was reviewed with the patient/guardian  The patient/guardian understands and agrees with the treatment plan1          Self Referrals: No       1 Amended By: Jarrett Hernández; Feb 15 2017 9:05 PM EST    Chief Complaint  Pt presents for Well Exam  Pt feels well today  Appetite is normal per patient  No refills needed at this time  No falls  Advance Directives  Advance Directive [facility]:   The patient is in agreement to receive the Advance Care Planning service    NO - Patient does not have an advance health care directive  Capacity/Competence: This patient has full decision making capacity for discussion of advance care planning and This patient has full decision making competency for discussion of advance care planning  History of Present Illness  HPI: Pt feels less tired and denies sob  Next chemo on Tuesday  CT on Monday  1    Welcome to [employer] and Wellness Visits: The patient is being seen for the subsequent annual wellness visit  Medicare Screening and Risk Factors1    Hospitalizations:1  she has been previously hospitalizied1  and she has been hospitalized lung cancer,pericarditis times1   Once per lifetime medicare screening tests:1  ECG1  and AAA screening US has not yet been done1   Medicare Screening Tests Risk Questions   Abdominal aortic aneurysm risk assessment:1  none indicated1   Osteoporosis risk assessment:1  caucasian1 , female gender1 , over 48years of age2 , the patient's weight is too low (<127 lbs)1  and tobacco use1   HIV risk assessment:1  none indicated1      Drug and Alcohol Use:1  The patient is a former cigarette smoker1  1   She  has smoked for 30 year(s)1  1   The patient reports  never drinking alcohol1  1   Alcohol concern: 1   The patient has no concerns about alcohol abuse1   She  has never used illicit drugs1  1   Diet and Physical Activity:1  Current diet includes  well balanced meals1  1   She  exercises infrequently1  and  now limited by cancer tx1  1   Mood Disorder and Cognitive Impairment Screenin    Depression screening  Mild to moderate symptoms1   She denies feeling down, depressed, or hopeless over the past two weeks1   She denies feeling little interest or pleasure in doing things over the past two weeks1   Cognitive impairment screenin  denies difficulty learning/retaining new information1 , denies difficulty handling complex tasks1 , denies difficulty with reasoning1 , denies difficulty with spatial ability and orientation1 , denies difficulty with language1  and denies difficulty with behavior1   Functional Ability/Level of Safety:1  Hearing is1  normal bilaterally1  and a hearing aid is not used1   She denies hearing difficulties1  The patient is currently1  able to do activities of daily living without limitations1 , able to do instrumental activities of daily living without limitations1 , able to participate in social activities without limitations1  and able to drive without limitations1   Activities of daily living details:1  does not need help using the phone1 , no transportation help needed1 , does not need help shopping1 , no meal preparation help needed1 , does not need help doing housework1 , does not need help doing laundry1 , does not need help managing medications1  and does not need help managing money1   Fall risk factors: 1  The patient fell 0 times in the past 12 months  1    Home safety risk factors: 1  no unfamiliar surroundings1 , no loose rugs1 , no poor household lighting1 , no uneven floors1 , no household clutter1 , grab bars in the bathroom1 and handrails on the stairs1   Advance Directives:1  Advance directives: 1  no living will1 , no durable power of  for health care directives1  and no advance directives1   End of life decisions were reviewed with the patient1  and I agree with the patient's decisions1   Co-Managers and Medical Equipment/Suppliers: See Patient Care Team   Reviewed Updated Merced Camacho:   Last Medicare Wellness Visit Information was reviewed, patient interviewed, no change since last AWV        1 Amended By: Amber Oconnell; Feb 15 2017 8:56 PM EST    Patient Care Team    Care Team Member Role Specialty Office Number   Obey RAINES , DO, [facility] Specialist Hematology Oncology [phone]   Dale Mccormick MD Specialist Thoracic Surgery [phone]   Asiya Downs   Pulmonary Medicine [phone]   [facility]  Internal Medicine , [place] [address] [facility]  Cardiology [phone]     Active Problems   1  Acute pericarditis (420 90) (I30 9)  2  Adenocarcinoma of right lung (162 9) (C34 91)  3  Advance directive discussed with patient (V65 49) (Z71 89)  4  Anxiety (300 00) (F41 9)  5  Benign essential hypertension (401 1) (I10)  6  Colon cancer screening (V76 51) (Z12 11)  7  Cough (786 2) (R05)  8  Encounter for routine gynecological examination (V72 31) (Z01 419)  9  Encounter for screening mammogram for malignant neoplasm of breast (V76 12)   (Z12 31)  10  Hyperlipidemia (272 4) (E78 5)  11  Hypertension (401 9) (I10)  12  Liver metastases (197 7) (C78 7)  13  Lung mass (786 6) (R91 8)  14  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)  15  Pericardial effusion (423 9) (I31 3)  16  Pleural effusion on right (511 9) (J90)  17  Pneumonia (486) (J18 9)  18  Poor appetite (783 0) (R63 0)  19  Post-menopausal osteoporosis (733 01) (M81 0)  20  Refused influenza vaccine (V64 06) (Z28 21)  21   Shortness of breath (786 05) (R06 02)    Past Medical History    · History of Encounter for screening mammogram for malignant neoplasm of breast  (V76 12) (Z12 31)   · History of Pneumonia (486) (J18 9)   · History of Red eye (379 93) (H57 8)   · History of Sinusitis (473 9) (J32 9)    The active problems and past medical history were reviewed and updated today  Surgical History    · History of Salpingo-oophorectomy Bilateral   · History of Total Abdominal Hysterectomy    The surgical history was reviewed and updated today  Family History  Mother    · Family history of Diabetes (250 00) (E11 9)   · Family history of Hypertension  Father    · Family history of Asthma (493 90) (J45 909)  Brother    · Family history of malignant neoplasm of stomach (V16 0) (Z80 0)  Family History    · Denied: Family history of colon cancer   · Denied: Family history of malignant neoplasm of breast   · Denied: Family history of malignant neoplasm of uterus   · Denied: Family history of osteoporosis   · Denied: Family history of Ovarian ca    The family history was reviewed and updated today  Social History    · Caffeine Use   · Dental care, occasionally   · Denied: Drug use (305 90) (F19 90)   · Former smoker (V15 82) (Q33 203)   · Lives independently with spouse   · No alcohol use   · Sun Protection Sunscreen   · Uses Safety Equipment - Seatbelts  The social history was reviewed and updated today  The social history was reviewed and is unchanged  Current Meds  1  B Complex CAPS; Therapy: (Recorded:68Cnb4606) to Recorded  2  Calcium + D TABS; Take as directed per package instructions Recorded  3  Lidocaine-Prilocaine 2 5-2 5 % External Cream; APPLY 1 HOUR BEFORE PROCEDURE   AS DIRECTED; Therapy: 26KLD6784 to (Evaluate:07Fpf5485)  Requested for: 03LTR9521; Last   Rx:2016 Ordered  4  Lisinopril 20 MG Oral Tablet; TAKE ONE TAB IN AM AND 1/2 TAB IN PM;   Therapy: (Recorded:08Yhp6342) to Recorded  5   Metoprolol Tartrate 25 MG Oral Tablet; TAKE 1 TABLET TWICE DAILY  Requested for:   02ERJ8783; Last [id number] Ordered  6  Mirtazapine 15 MG Oral Tablet; TAKE 1 TABLET AT BEDTIME; Therapy: 23LLQ8090 to (Evaluate:70Xaq4411)  Requested for: 28PFT3984; Last   Rx:70Vxw3263 Ordered  7  Vitamin D 2000 UNIT Oral Tablet Recorded  8  Vitamin E TABS; Therapy: (Recorded:80Nvj2989) to Recorded    The medication list was reviewed and updated today  Allergies   1  Tetracyclines   2  Shellfish    Immunizations   1    Influenza  Temporarily Deferred: Pt refuses, As of: 64UXO0623, Defer for 1 Years    PPSV  18-Oct-2007    Tdap  2009     Vitals  Signs    Systolic: 3216   Diastolic: 078    Temperature: 96 9 F  Heart Rate: 117  Height: 5 ft 2 5 in  Weight: 105 lb 8 0 oz  BMI Calculated: 18 99  BSA Calculated: 1 47  O2 Saturation: 95     1 Amended By: Odette Mendoza; Feb 15 2017 8:57 PM EST    Physical Exam    Constitutional1    General appearance: No acute distress, well appearing and well nourished  1  Much brighter,less sob1   Neurologic1    Cranial nerves: Cranial nerves II-XII intact  1    Cortical function: Normal mental status  1    Reflexes: 2+ and symmetric  1    Sensation: No sensory loss  1    Coordination: Normal finger to nose and heel to shin  1    Psychiatric1    Judgment and insight: Normal 1    Orientation to person, place, and time: Normal 1    Recent and remote memory: Intact  1    Mood and affect: Normal 1        1 Amended By: Odette Mendoza; Feb 15 2017 8:58 PM EST    Health Management  Health Maintenance   Medicare Annual Wellness Visit; every 1 year; Last 03OTO3547; Next Due: 68NML8750;   Active    Future Appointments    Date/Time Provider Specialty Site   02/24/2017 09:00 AM TRINITY Lemus , , [facility] Hematology Oncology [facility] [place]   02/23/2017 08:30 AM Erickson Jiménez DO Cardiology [address]     Signatures   Electronically signed by : Dellar Fabry, DO; Feb 15 2017  9:05PM EST                       (Author)

## 2018-03-07 NOTE — PROGRESS NOTES
Dear Barbi Salvador,  My name is Jayce Villanueva and I am a Registered Nurse and Care Coordinator for [facility]  We recently  spoke on the phone regarding your hospital stay and you opted to receive follow-up phone calls from me  Because of the reason that you were in the hospital, Medicare has placed you in a program called [facility]  One of  the benefits of the program is that you can have a nurse call regularly to answer any questions or concerns you may have  My phone number is listed below in case you would need to contact me      Sincerely,   Jayce Villanueva RN  [phone]            Electronically signed Dheeraj Neff RN  Jan 4 2017 10:58AM EST Author     Electronically signed Dheeraj Neff RN  Jan 4 2017 10:58AM EST Author

## (undated) DEVICE — GLOVE INDICATOR PI UNDERGLOVE SZ 7 BLUE

## (undated) DEVICE — SUT PROLENE 2-0 CT-2 30 IN 8411H

## (undated) DEVICE — 2000CC GUARDIAN II: Brand: GUARDIAN

## (undated) DEVICE — DRAPE C-ARM X-RAY

## (undated) DEVICE — TELFA NON-ADHERENT ABSORBENT DRESSING: Brand: TELFA

## (undated) DEVICE — 3M™ TEGADERM™ TRANSPARENT FILM DRESSING FRAME STYLE, 1624W, 2-3/8 IN X 2-3/4 IN (6 CM X 7 CM), 100/CT 4CT/CASE: Brand: 3M™ TEGADERM™

## (undated) DEVICE — TUBING SUCTION 5MM X 12 FT

## (undated) DEVICE — 3000CC GUARDIAN II: Brand: GUARDIAN

## (undated) DEVICE — SPECIMEN TRAP: Brand: ARGYLE

## (undated) DEVICE — 1200CC GUARDIAN II: Brand: GUARDIAN

## (undated) DEVICE — ADHESIVE SKN CLSR HISTOACRYL FLEX 0.5ML LF

## (undated) DEVICE — SPECIMEN CONTAINER STERILE PEEL PACK

## (undated) DEVICE — SYRINGE 10ML SLIP TIP LF

## (undated) DEVICE — 3M™ STERI-STRIP™ REINFORCED ADHESIVE SKIN CLOSURES, R1547, 1/2 IN X 4 IN (12 MM X 100 MM), 6 STRIPS/ENVELOPE: Brand: 3M™ STERI-STRIP™

## (undated) DEVICE — INTENDED FOR TISSUE SEPARATION, AND OTHER PROCEDURES THAT REQUIRE A SHARP SURGICAL BLADE TO PUNCTURE OR CUT.: Brand: BARD-PARKER SAFETY BLADES SIZE 15, STERILE

## (undated) DEVICE — STERILE MAJOR GENERAL PACK: Brand: CARDINAL HEALTH

## (undated) DEVICE — REM POLYHESIVE ADULT PATIENT RETURN ELECTRODE: Brand: VALLEYLAB

## (undated) DEVICE — GLOVE SRG BIOGEL ECLIPSE 7

## (undated) DEVICE — 28 FR RIGHT ANGLE – SOFT PVC CATHETER: Brand: PVC THORACIC CATHETERS

## (undated) DEVICE — STRL UNIVERSAL MINOR GENERAL: Brand: CARDINAL HEALTH

## (undated) DEVICE — SUT VICRYL 3-0 SH 27 IN J416H

## (undated) DEVICE — SPONGE TONSIL STRUNG 7/8 IN X-RAY DETECT

## (undated) DEVICE — CHLORAPREP HI-LITE 26ML ORANGE

## (undated) DEVICE — BAG DECANTER

## (undated) DEVICE — INTENDED FOR TISSUE SEPARATION, AND OTHER PROCEDURES THAT REQUIRE A SHARP SURGICAL BLADE TO PUNCTURE OR CUT.: Brand: BARD-PARKER SAFETY BLADES SIZE 10, STERILE

## (undated) DEVICE — SUT SILK 2-0 TIES 144 IN LA55G

## (undated) DEVICE — ROSEBUD DISSECTORS: Brand: DEROYAL

## (undated) DEVICE — 28 FR STRAIGHT – SOFT PVC CATHETER: Brand: PVC THORACIC CATHETERS

## (undated) DEVICE — NEEDLE 25G X 1 1/2

## (undated) DEVICE — SUT PROLENE 0 CT-1 30 IN 8424H

## (undated) DEVICE — PROVE COVER: Brand: UNBRANDED

## (undated) DEVICE — SYRINGE 10ML LL

## (undated) DEVICE — Y BARBED CONNECTOR,POLYPROPYLENE, LARGE: Brand: ARGYLE

## (undated) DEVICE — MEDI-VAC YANK SUCT HNDL W/TPRD BULBOUS TIP: Brand: CARDINAL HEALTH

## (undated) DEVICE — 3M™ TEGADERM™ TRANSPARENT FILM DRESSING FRAME STYLE, 1626W, 4 IN X 4-3/4 IN (10 CM X 12 CM), 50/CT 4CT/CASE: Brand: 3M™ TEGADERM™

## (undated) DEVICE — OASIS DRAIN, SINGLE, INLINE & ATS COMPATIBLE: Brand: OASIS

## (undated) DEVICE — SUT MONOCRYL 4-0 PS-2 27 IN Y426H

## (undated) DEVICE — SUT ETHILON 3-0 FS-1 18 IN 663G

## (undated) DEVICE — CURITY PLAIN PACKING STRIP: Brand: CURITY

## (undated) DEVICE — GLOVE SRG BIOGEL ORTHOPEDIC 7.5

## (undated) DEVICE — SUT MONOCRYL 4-0 PS-2 18 IN Y496G